# Patient Record
Sex: MALE | Race: BLACK OR AFRICAN AMERICAN | URBAN - METROPOLITAN AREA
[De-identification: names, ages, dates, MRNs, and addresses within clinical notes are randomized per-mention and may not be internally consistent; named-entity substitution may affect disease eponyms.]

---

## 2018-04-17 ENCOUNTER — TRANSFERRED RECORDS (OUTPATIENT)
Dept: HEALTH INFORMATION MANAGEMENT | Facility: CLINIC | Age: 63
End: 2018-04-17

## 2018-06-28 ENCOUNTER — TELEPHONE (OUTPATIENT)
Dept: FAMILY MEDICINE | Facility: CLINIC | Age: 63
End: 2018-06-28
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
## 2021-06-02 ENCOUNTER — RECORDS - HEALTHEAST (OUTPATIENT)
Dept: ADMINISTRATIVE | Facility: CLINIC | Age: 66
End: 2021-06-02

## 2025-06-29 ENCOUNTER — HOSPITAL ENCOUNTER (INPATIENT)
Facility: HOSPITAL | Age: 70
End: 2025-06-29
Attending: EMERGENCY MEDICINE | Admitting: FAMILY MEDICINE
Payer: MEDICARE

## 2025-06-29 DIAGNOSIS — C78.7 METASTATIC COLON CANCER TO LIVER (H): ICD-10-CM

## 2025-06-29 DIAGNOSIS — C18.9 METASTATIC COLON CANCER TO LIVER (H): ICD-10-CM

## 2025-06-29 DIAGNOSIS — K63.89 INTESTINAL MASS: ICD-10-CM

## 2025-06-29 DIAGNOSIS — R11.0 NAUSEA: ICD-10-CM

## 2025-06-29 DIAGNOSIS — K76.9 LIVER LESION: Primary | ICD-10-CM

## 2025-06-29 DIAGNOSIS — R10.11 RUQ ABDOMINAL PAIN: ICD-10-CM

## 2025-06-29 DIAGNOSIS — R10.84 GENERALIZED ABDOMINAL PAIN: ICD-10-CM

## 2025-06-29 DIAGNOSIS — D64.9 ANEMIA, UNSPECIFIED TYPE: ICD-10-CM

## 2025-06-29 LAB — LACTATE SERPL-SCNC: 3.2 MMOL/L (ref 0.7–2)

## 2025-06-29 PROCEDURE — 82728 ASSAY OF FERRITIN: CPT

## 2025-06-29 PROCEDURE — 83605 ASSAY OF LACTIC ACID: CPT | Performed by: EMERGENCY MEDICINE

## 2025-06-29 PROCEDURE — 36415 COLL VENOUS BLD VENIPUNCTURE: CPT | Performed by: EMERGENCY MEDICINE

## 2025-06-29 PROCEDURE — 82310 ASSAY OF CALCIUM: CPT | Performed by: EMERGENCY MEDICINE

## 2025-06-29 PROCEDURE — 96375 TX/PRO/DX INJ NEW DRUG ADDON: CPT

## 2025-06-29 PROCEDURE — 99291 CRITICAL CARE FIRST HOUR: CPT | Mod: 25

## 2025-06-29 PROCEDURE — 82947 ASSAY GLUCOSE BLOOD QUANT: CPT | Performed by: EMERGENCY MEDICINE

## 2025-06-29 PROCEDURE — 250N000011 HC RX IP 250 OP 636: Mod: JZ | Performed by: EMERGENCY MEDICINE

## 2025-06-29 PROCEDURE — 85027 COMPLETE CBC AUTOMATED: CPT | Performed by: EMERGENCY MEDICINE

## 2025-06-29 RX ORDER — ONDANSETRON 2 MG/ML
4 INJECTION INTRAMUSCULAR; INTRAVENOUS ONCE
Status: COMPLETED | OUTPATIENT
Start: 2025-06-30 | End: 2025-06-30

## 2025-06-29 RX ORDER — HYDROMORPHONE HYDROCHLORIDE 1 MG/ML
0.5 INJECTION, SOLUTION INTRAMUSCULAR; INTRAVENOUS; SUBCUTANEOUS ONCE
Refills: 0 | Status: COMPLETED | OUTPATIENT
Start: 2025-06-30 | End: 2025-06-29

## 2025-06-29 RX ADMIN — HYDROMORPHONE HYDROCHLORIDE 0.5 MG: 1 INJECTION, SOLUTION INTRAMUSCULAR; INTRAVENOUS; SUBCUTANEOUS at 23:53

## 2025-06-29 ASSESSMENT — COLUMBIA-SUICIDE SEVERITY RATING SCALE - C-SSRS
1. IN THE PAST MONTH, HAVE YOU WISHED YOU WERE DEAD OR WISHED YOU COULD GO TO SLEEP AND NOT WAKE UP?: NO
2. HAVE YOU ACTUALLY HAD ANY THOUGHTS OF KILLING YOURSELF IN THE PAST MONTH?: NO
6. HAVE YOU EVER DONE ANYTHING, STARTED TO DO ANYTHING, OR PREPARED TO DO ANYTHING TO END YOUR LIFE?: NO

## 2025-06-30 ENCOUNTER — APPOINTMENT (OUTPATIENT)
Dept: ULTRASOUND IMAGING | Facility: HOSPITAL | Age: 70
DRG: 435 | End: 2025-06-30
Attending: STUDENT IN AN ORGANIZED HEALTH CARE EDUCATION/TRAINING PROGRAM
Payer: MEDICARE

## 2025-06-30 ENCOUNTER — APPOINTMENT (OUTPATIENT)
Dept: CT IMAGING | Facility: HOSPITAL | Age: 70
DRG: 435 | End: 2025-06-30
Attending: EMERGENCY MEDICINE
Payer: MEDICARE

## 2025-06-30 PROBLEM — K63.89 INTESTINAL MASS: Status: ACTIVE | Noted: 2025-06-30

## 2025-06-30 PROBLEM — R11.0 NAUSEA: Status: ACTIVE | Noted: 2025-06-30

## 2025-06-30 PROBLEM — K76.9 LIVER LESION: Status: ACTIVE | Noted: 2025-06-30

## 2025-06-30 PROBLEM — D64.9 ANEMIA, UNSPECIFIED TYPE: Status: ACTIVE | Noted: 2025-06-30

## 2025-06-30 PROBLEM — R10.84 GENERALIZED ABDOMINAL PAIN: Status: ACTIVE | Noted: 2025-06-30

## 2025-06-30 LAB
ABO + RH BLD: NORMAL
ALBUMIN SERPL BCG-MCNC: 2.8 G/DL (ref 3.5–5.2)
ALBUMIN UR-MCNC: NEGATIVE MG/DL
ALP SERPL-CCNC: 248 U/L (ref 40–150)
ALT SERPL W P-5'-P-CCNC: 16 U/L (ref 0–70)
ANION GAP SERPL CALCULATED.3IONS-SCNC: 12 MMOL/L (ref 7–15)
ANION GAP SERPL CALCULATED.3IONS-SCNC: 8 MMOL/L (ref 7–15)
APPEARANCE UR: CLEAR
APTT PPP: 34 SECONDS (ref 22–38)
AST SERPL W P-5'-P-CCNC: 98 U/L (ref 0–45)
BILIRUB SERPL-MCNC: 0.6 MG/DL
BILIRUB UR QL STRIP: NEGATIVE
BLD GP AB SCN SERPL QL: NEGATIVE
BLD PROD TYP BPU: NORMAL
BLD PROD TYP BPU: NORMAL
BLOOD COMPONENT TYPE: NORMAL
BLOOD COMPONENT TYPE: NORMAL
BUN SERPL-MCNC: 13.5 MG/DL (ref 8–23)
BUN SERPL-MCNC: 15.3 MG/DL (ref 8–23)
CALCIUM SERPL-MCNC: 8.5 MG/DL (ref 8.8–10.4)
CALCIUM SERPL-MCNC: 8.5 MG/DL (ref 8.8–10.4)
CHLORIDE SERPL-SCNC: 102 MMOL/L (ref 98–107)
CHLORIDE SERPL-SCNC: 99 MMOL/L (ref 98–107)
CODING SYSTEM: NORMAL
CODING SYSTEM: NORMAL
COLOR UR AUTO: NORMAL
CREAT SERPL-MCNC: 0.88 MG/DL (ref 0.67–1.17)
CREAT SERPL-MCNC: 0.94 MG/DL (ref 0.67–1.17)
CROSSMATCH: NORMAL
CROSSMATCH: NORMAL
DACRYOCYTES BLD QL SMEAR: SLIGHT
EGFRCR SERPLBLD CKD-EPI 2021: 88 ML/MIN/1.73M2
EGFRCR SERPLBLD CKD-EPI 2021: >90 ML/MIN/1.73M2
ERYTHROCYTE [DISTWIDTH] IN BLOOD BY AUTOMATED COUNT: 21.2 % (ref 10–15)
ERYTHROCYTE [DISTWIDTH] IN BLOOD BY AUTOMATED COUNT: 21.4 % (ref 10–15)
ERYTHROCYTE [DISTWIDTH] IN BLOOD BY AUTOMATED COUNT: 31.4 % (ref 10–15)
FERRITIN SERPL-MCNC: 100 NG/ML (ref 31–409)
FRAGMENTS BLD QL SMEAR: SLIGHT
GLUCOSE SERPL-MCNC: 174 MG/DL (ref 70–99)
GLUCOSE SERPL-MCNC: 89 MG/DL (ref 70–99)
GLUCOSE UR STRIP-MCNC: NEGATIVE MG/DL
HCO3 SERPL-SCNC: 25 MMOL/L (ref 22–29)
HCO3 SERPL-SCNC: 27 MMOL/L (ref 22–29)
HCT VFR BLD AUTO: 21.2 % (ref 40–53)
HCT VFR BLD AUTO: 23 % (ref 40–53)
HCT VFR BLD AUTO: 29.7 % (ref 40–53)
HGB BLD-MCNC: 10 G/DL (ref 13.3–17.7)
HGB BLD-MCNC: 7 G/DL (ref 13.3–17.7)
HGB BLD-MCNC: 7.6 G/DL (ref 13.3–17.7)
HGB UR QL STRIP: NEGATIVE
INR PPP: 1.08 (ref 0.85–1.15)
ISSUE DATE AND TIME: NORMAL
ISSUE DATE AND TIME: NORMAL
KETONES UR STRIP-MCNC: NEGATIVE MG/DL
LACTATE SERPL-SCNC: 2.4 MMOL/L (ref 0.7–2)
LEUKOCYTE ESTERASE UR QL STRIP: NEGATIVE
MCH RBC QN AUTO: 16.5 PG (ref 26.5–33)
MCH RBC QN AUTO: 16.5 PG (ref 26.5–33)
MCH RBC QN AUTO: 19 PG (ref 26.5–33)
MCHC RBC AUTO-ENTMCNC: 33 G/DL (ref 31.5–36.5)
MCHC RBC AUTO-ENTMCNC: 33 G/DL (ref 31.5–36.5)
MCHC RBC AUTO-ENTMCNC: 33.7 G/DL (ref 31.5–36.5)
MCV RBC AUTO: 50 FL (ref 78–100)
MCV RBC AUTO: 50 FL (ref 78–100)
MCV RBC AUTO: 57 FL (ref 78–100)
NITRATE UR QL: NEGATIVE
PATH REV: ABNORMAL
PH UR STRIP: 5.5 [PH] (ref 5–7)
PLAT MORPH BLD: ABNORMAL
PLATELET # BLD AUTO: 410 10E3/UL (ref 150–450)
PLATELET # BLD AUTO: 424 10E3/UL (ref 150–450)
PLATELET # BLD AUTO: 449 10E3/UL (ref 150–450)
POTASSIUM SERPL-SCNC: 3.8 MMOL/L (ref 3.4–5.3)
POTASSIUM SERPL-SCNC: 4.2 MMOL/L (ref 3.4–5.3)
PROT SERPL-MCNC: 6.3 G/DL (ref 6.4–8.3)
PROTHROMBIN TIME: 14.2 SECONDS (ref 11.8–14.8)
RBC # BLD AUTO: 4.24 10E6/UL (ref 4.4–5.9)
RBC # BLD AUTO: 4.6 10E6/UL (ref 4.4–5.9)
RBC # BLD AUTO: 5.26 10E6/UL (ref 4.4–5.9)
RBC MORPH BLD: ABNORMAL
RBC URINE: <1 /HPF
SICKLE CELLS BLD QL SMEAR: SLIGHT
SODIUM SERPL-SCNC: 136 MMOL/L (ref 135–145)
SODIUM SERPL-SCNC: 137 MMOL/L (ref 135–145)
SP GR UR STRIP: 1.02 (ref 1–1.03)
SPECIMEN EXP DATE BLD: NORMAL
SQUAMOUS EPITHELIAL: <1 /HPF
TARGETS BLD QL SMEAR: ABNORMAL
TRANSITIONAL EPI: <1 /HPF
UNIT ABO/RH: NORMAL
UNIT ABO/RH: NORMAL
UNIT NUMBER: NORMAL
UNIT NUMBER: NORMAL
UNIT STATUS: NORMAL
UNIT STATUS: NORMAL
UNIT TYPE ISBT: 7300
UNIT TYPE ISBT: 7300
UROBILINOGEN UR STRIP-MCNC: NORMAL MG/DL
WBC # BLD AUTO: 7.2 10E3/UL (ref 4–11)
WBC # BLD AUTO: 7.7 10E3/UL (ref 4–11)
WBC # BLD AUTO: 8.8 10E3/UL (ref 4–11)
WBC URINE: 1 /HPF

## 2025-06-30 PROCEDURE — P9016 RBC LEUKOCYTES REDUCED: HCPCS | Performed by: EMERGENCY MEDICINE

## 2025-06-30 PROCEDURE — 74177 CT ABD & PELVIS W/CONTRAST: CPT

## 2025-06-30 PROCEDURE — 96376 TX/PRO/DX INJ SAME DRUG ADON: CPT

## 2025-06-30 PROCEDURE — 250N000011 HC RX IP 250 OP 636: Performed by: EMERGENCY MEDICINE

## 2025-06-30 PROCEDURE — 88377 M/PHMTRC ALYS ISHQUANT/SEMIQ: CPT | Performed by: STUDENT IN AN ORGANIZED HEALTH CARE EDUCATION/TRAINING PROGRAM

## 2025-06-30 PROCEDURE — 36415 COLL VENOUS BLD VENIPUNCTURE: CPT | Performed by: EMERGENCY MEDICINE

## 2025-06-30 PROCEDURE — 88377 M/PHMTRC ALYS ISHQUANT/SEMIQ: CPT | Mod: 26 | Performed by: MEDICAL GENETICS

## 2025-06-30 PROCEDURE — 86901 BLOOD TYPING SEROLOGIC RH(D): CPT | Performed by: EMERGENCY MEDICINE

## 2025-06-30 PROCEDURE — 88341 IMHCHEM/IMCYTCHM EA ADD ANTB: CPT | Mod: TC | Performed by: STUDENT IN AN ORGANIZED HEALTH CARE EDUCATION/TRAINING PROGRAM

## 2025-06-30 PROCEDURE — 81001 URINALYSIS AUTO W/SCOPE: CPT

## 2025-06-30 PROCEDURE — 36415 COLL VENOUS BLD VENIPUNCTURE: CPT

## 2025-06-30 PROCEDURE — 250N000013 HC RX MED GY IP 250 OP 250 PS 637

## 2025-06-30 PROCEDURE — 83605 ASSAY OF LACTIC ACID: CPT | Performed by: EMERGENCY MEDICINE

## 2025-06-30 PROCEDURE — 85027 COMPLETE CBC AUTOMATED: CPT | Performed by: EMERGENCY MEDICINE

## 2025-06-30 PROCEDURE — 258N000003 HC RX IP 258 OP 636: Performed by: EMERGENCY MEDICINE

## 2025-06-30 PROCEDURE — 99222 1ST HOSP IP/OBS MODERATE 55: CPT | Mod: AI

## 2025-06-30 PROCEDURE — 80048 BASIC METABOLIC PNL TOTAL CA: CPT

## 2025-06-30 PROCEDURE — 120N000001 HC R&B MED SURG/OB

## 2025-06-30 PROCEDURE — 85018 HEMOGLOBIN: CPT

## 2025-06-30 PROCEDURE — 250N000011 HC RX IP 250 OP 636: Mod: JZ | Performed by: EMERGENCY MEDICINE

## 2025-06-30 PROCEDURE — 250N000011 HC RX IP 250 OP 636: Performed by: STUDENT IN AN ORGANIZED HEALTH CARE EDUCATION/TRAINING PROGRAM

## 2025-06-30 PROCEDURE — 272N000710 US BIOPSY LIVER

## 2025-06-30 PROCEDURE — 96374 THER/PROPH/DIAG INJ IV PUSH: CPT | Mod: 59

## 2025-06-30 PROCEDURE — 86923 COMPATIBILITY TEST ELECTRIC: CPT | Performed by: EMERGENCY MEDICINE

## 2025-06-30 PROCEDURE — 96361 HYDRATE IV INFUSION ADD-ON: CPT

## 2025-06-30 PROCEDURE — 0FB03ZX EXCISION OF LIVER, PERCUTANEOUS APPROACH, DIAGNOSTIC: ICD-10-PCS | Performed by: STUDENT IN AN ORGANIZED HEALTH CARE EDUCATION/TRAINING PROGRAM

## 2025-06-30 PROCEDURE — 85730 THROMBOPLASTIN TIME PARTIAL: CPT

## 2025-06-30 PROCEDURE — 250N000011 HC RX IP 250 OP 636

## 2025-06-30 PROCEDURE — 85610 PROTHROMBIN TIME: CPT

## 2025-06-30 RX ORDER — FLUMAZENIL 0.1 MG/ML
0.2 INJECTION, SOLUTION INTRAVENOUS
Status: DISCONTINUED | OUTPATIENT
Start: 2025-06-30 | End: 2025-06-30

## 2025-06-30 RX ORDER — IOPAMIDOL 755 MG/ML
90 INJECTION, SOLUTION INTRAVASCULAR ONCE
Status: COMPLETED | OUTPATIENT
Start: 2025-06-30 | End: 2025-06-30

## 2025-06-30 RX ORDER — NALOXONE HYDROCHLORIDE 0.4 MG/ML
0.2 INJECTION, SOLUTION INTRAMUSCULAR; INTRAVENOUS; SUBCUTANEOUS
Status: ACTIVE | OUTPATIENT
Start: 2025-06-30

## 2025-06-30 RX ORDER — AMOXICILLIN 250 MG
1 CAPSULE ORAL 2 TIMES DAILY PRN
Status: ACTIVE | OUTPATIENT
Start: 2025-06-30

## 2025-06-30 RX ORDER — NALOXONE HYDROCHLORIDE 0.4 MG/ML
0.2 INJECTION, SOLUTION INTRAMUSCULAR; INTRAVENOUS; SUBCUTANEOUS
Status: DISCONTINUED | OUTPATIENT
Start: 2025-06-30 | End: 2025-06-30

## 2025-06-30 RX ORDER — HYDROMORPHONE HYDROCHLORIDE 1 MG/ML
0.5 INJECTION, SOLUTION INTRAMUSCULAR; INTRAVENOUS; SUBCUTANEOUS ONCE
Refills: 0 | Status: COMPLETED | OUTPATIENT
Start: 2025-06-30 | End: 2025-06-30

## 2025-06-30 RX ORDER — ONDANSETRON 2 MG/ML
4 INJECTION INTRAMUSCULAR; INTRAVENOUS EVERY 6 HOURS PRN
Status: ACTIVE | OUTPATIENT
Start: 2025-06-30

## 2025-06-30 RX ORDER — HYDROMORPHONE HYDROCHLORIDE 1 MG/ML
0.3 INJECTION, SOLUTION INTRAMUSCULAR; INTRAVENOUS; SUBCUTANEOUS
Status: DISCONTINUED | OUTPATIENT
Start: 2025-06-30 | End: 2025-07-03

## 2025-06-30 RX ORDER — OXYCODONE HYDROCHLORIDE 5 MG/1
5 TABLET ORAL EVERY 4 HOURS PRN
Status: DISCONTINUED | OUTPATIENT
Start: 2025-06-30 | End: 2025-07-01

## 2025-06-30 RX ORDER — AMLODIPINE BESYLATE 5 MG/1
5 TABLET ORAL DAILY
Status: ON HOLD | COMMUNITY

## 2025-06-30 RX ORDER — LIDOCAINE 40 MG/G
CREAM TOPICAL
Status: ACTIVE | OUTPATIENT
Start: 2025-06-30

## 2025-06-30 RX ORDER — AMOXICILLIN 250 MG
2 CAPSULE ORAL 2 TIMES DAILY PRN
Status: ACTIVE | OUTPATIENT
Start: 2025-06-30

## 2025-06-30 RX ORDER — ACETAMINOPHEN 500 MG
500 TABLET ORAL EVERY 6 HOURS
Status: DISPENSED | OUTPATIENT
Start: 2025-06-30

## 2025-06-30 RX ORDER — ACETAMINOPHEN 500 MG
500 TABLET ORAL EVERY 6 HOURS PRN
Status: DISCONTINUED | OUTPATIENT
Start: 2025-06-30 | End: 2025-06-30

## 2025-06-30 RX ORDER — NALOXONE HYDROCHLORIDE 0.4 MG/ML
0.4 INJECTION, SOLUTION INTRAMUSCULAR; INTRAVENOUS; SUBCUTANEOUS
Status: DISCONTINUED | OUTPATIENT
Start: 2025-06-30 | End: 2025-06-30

## 2025-06-30 RX ORDER — ONDANSETRON 4 MG/1
4 TABLET, ORALLY DISINTEGRATING ORAL EVERY 6 HOURS PRN
Status: DISPENSED | OUTPATIENT
Start: 2025-06-30

## 2025-06-30 RX ORDER — FENTANYL CITRATE 50 UG/ML
25-50 INJECTION, SOLUTION INTRAMUSCULAR; INTRAVENOUS EVERY 5 MIN PRN
Refills: 0 | Status: DISCONTINUED | OUTPATIENT
Start: 2025-06-30 | End: 2025-06-30

## 2025-06-30 RX ORDER — DIPHENHYDRAMINE HYDROCHLORIDE 50 MG/ML
25 INJECTION, SOLUTION INTRAMUSCULAR; INTRAVENOUS ONCE
Status: COMPLETED | OUTPATIENT
Start: 2025-06-30 | End: 2025-06-30

## 2025-06-30 RX ORDER — ENOXAPARIN SODIUM 100 MG/ML
40 INJECTION SUBCUTANEOUS EVERY 24 HOURS
Status: DISPENSED | OUTPATIENT
Start: 2025-06-30

## 2025-06-30 RX ORDER — HYDROXYZINE HYDROCHLORIDE 10 MG/1
10 TABLET, FILM COATED ORAL 3 TIMES DAILY PRN
Status: DISPENSED | OUTPATIENT
Start: 2025-06-30

## 2025-06-30 RX ORDER — NALOXONE HYDROCHLORIDE 0.4 MG/ML
0.4 INJECTION, SOLUTION INTRAMUSCULAR; INTRAVENOUS; SUBCUTANEOUS
Status: ACTIVE | OUTPATIENT
Start: 2025-06-30

## 2025-06-30 RX ORDER — CALCIUM CARBONATE 500 MG/1
1000 TABLET, CHEWABLE ORAL 4 TIMES DAILY PRN
Status: ACTIVE | OUTPATIENT
Start: 2025-06-30

## 2025-06-30 RX ORDER — ACETAMINOPHEN 500 MG
500-1000 TABLET ORAL EVERY 6 HOURS PRN
Status: ON HOLD | COMMUNITY

## 2025-06-30 RX ORDER — POLYETHYLENE GLYCOL 3350 17 G/17G
17 POWDER, FOR SOLUTION ORAL DAILY
Status: DISPENSED | OUTPATIENT
Start: 2025-06-30

## 2025-06-30 RX ORDER — PROCHLORPERAZINE MALEATE 5 MG/1
5 TABLET ORAL EVERY 6 HOURS PRN
Status: ACTIVE | OUTPATIENT
Start: 2025-06-30

## 2025-06-30 RX ORDER — AMLODIPINE BESYLATE 5 MG/1
5 TABLET ORAL 2 TIMES DAILY
Status: DISPENSED | OUTPATIENT
Start: 2025-06-30

## 2025-06-30 RX ADMIN — ACETAMINOPHEN 500 MG: 500 TABLET ORAL at 18:42

## 2025-06-30 RX ADMIN — FENTANYL CITRATE 50 MCG: 50 INJECTION, SOLUTION INTRAMUSCULAR; INTRAVENOUS at 12:58

## 2025-06-30 RX ADMIN — OXYCODONE HYDROCHLORIDE 5 MG: 5 TABLET ORAL at 08:34

## 2025-06-30 RX ADMIN — HYDROMORPHONE HYDROCHLORIDE 0.5 MG: 1 INJECTION, SOLUTION INTRAMUSCULAR; INTRAVENOUS; SUBCUTANEOUS at 01:16

## 2025-06-30 RX ADMIN — IOPAMIDOL 90 ML: 755 INJECTION, SOLUTION INTRAVENOUS at 02:06

## 2025-06-30 RX ADMIN — HYDROXYZINE HYDROCHLORIDE 10 MG: 10 TABLET, FILM COATED ORAL at 19:50

## 2025-06-30 RX ADMIN — HYDROMORPHONE HYDROCHLORIDE 0.3 MG: 1 INJECTION, SOLUTION INTRAMUSCULAR; INTRAVENOUS; SUBCUTANEOUS at 23:30

## 2025-06-30 RX ADMIN — HYDROMORPHONE HYDROCHLORIDE 0.3 MG: 1 INJECTION, SOLUTION INTRAMUSCULAR; INTRAVENOUS; SUBCUTANEOUS at 16:40

## 2025-06-30 RX ADMIN — OXYCODONE HYDROCHLORIDE 5 MG: 5 TABLET ORAL at 15:09

## 2025-06-30 RX ADMIN — MIDAZOLAM HYDROCHLORIDE 0.5 MG: 1 INJECTION, SOLUTION INTRAMUSCULAR; INTRAVENOUS at 13:01

## 2025-06-30 RX ADMIN — HYDROMORPHONE HYDROCHLORIDE 0.3 MG: 1 INJECTION, SOLUTION INTRAMUSCULAR; INTRAVENOUS; SUBCUTANEOUS at 19:50

## 2025-06-30 RX ADMIN — FENTANYL CITRATE 50 MCG: 50 INJECTION, SOLUTION INTRAMUSCULAR; INTRAVENOUS at 13:03

## 2025-06-30 RX ADMIN — AMLODIPINE BESYLATE 5 MG: 5 TABLET ORAL at 18:42

## 2025-06-30 RX ADMIN — ONDANSETRON 4 MG: 2 INJECTION INTRAMUSCULAR; INTRAVENOUS at 00:00

## 2025-06-30 RX ADMIN — DIPHENHYDRAMINE HYDROCHLORIDE 25 MG: 50 INJECTION, SOLUTION INTRAMUSCULAR; INTRAVENOUS at 04:25

## 2025-06-30 RX ADMIN — POLYETHYLENE GLYCOL 3350 17 G: 17 POWDER, FOR SOLUTION ORAL at 15:10

## 2025-06-30 RX ADMIN — AMLODIPINE BESYLATE 5 MG: 5 TABLET ORAL at 12:16

## 2025-06-30 RX ADMIN — HYDROMORPHONE HYDROCHLORIDE 1 MG: 1 INJECTION, SOLUTION INTRAMUSCULAR; INTRAVENOUS; SUBCUTANEOUS at 02:46

## 2025-06-30 RX ADMIN — OXYCODONE HYDROCHLORIDE 5 MG: 5 TABLET ORAL at 21:54

## 2025-06-30 RX ADMIN — SODIUM CHLORIDE 1000 ML: 0.9 INJECTION, SOLUTION INTRAVENOUS at 00:22

## 2025-06-30 RX ADMIN — HYDROMORPHONE HYDROCHLORIDE 0.3 MG: 1 INJECTION, SOLUTION INTRAMUSCULAR; INTRAVENOUS; SUBCUTANEOUS at 10:14

## 2025-06-30 RX ADMIN — MIDAZOLAM HYDROCHLORIDE 1 MG: 1 INJECTION, SOLUTION INTRAMUSCULAR; INTRAVENOUS at 12:56

## 2025-06-30 ASSESSMENT — ACTIVITIES OF DAILY LIVING (ADL)
ADLS_ACUITY_SCORE: 41
ADLS_ACUITY_SCORE: 51
ADLS_ACUITY_SCORE: 41
ADLS_ACUITY_SCORE: 50
ADLS_ACUITY_SCORE: 50
ADLS_ACUITY_SCORE: 41
ADLS_ACUITY_SCORE: 43
ADLS_ACUITY_SCORE: 50
ADLS_ACUITY_SCORE: 50
ADLS_ACUITY_SCORE: 41
ADLS_ACUITY_SCORE: 43
ADLS_ACUITY_SCORE: 50
ADLS_ACUITY_SCORE: 43
ADLS_ACUITY_SCORE: 50
ADLS_ACUITY_SCORE: 43

## 2025-06-30 NOTE — PROGRESS NOTES
"Paged by nursing staff to come evaluate the patient for pain at his biopsy site as well as concern for some swelling per family. The patient reports that he has been feeling some pain along his right abdomen near the biopsy site and is tender to palpation all across the right side of his abdomen. He feels mildly nauseated but has not had any vomiting. He has had a bowel movement today and has an appetite so he will plan to eat dinner soon.     Vital signs:  Temp: 98.1  F (36.7  C) Temp src: Oral BP: (!) 170/92 Pulse: 85   Resp: 18 SpO2: 96 % O2 Device: None (Room air)   Height: 177.8 cm (5' 10\") Weight: 61.2 kg (135 lb)  Estimated body mass index is 19.37 kg/m  as calculated from the following:    Height as of this encounter: 1.778 m (5' 10\").    Weight as of this encounter: 61.2 kg (135 lb).    GEN: Pleasant male, in no acute distress.  HEENT: Normocephalic, atraumatic. Extraoccular eye movements intact. Mildly icteric sclera.   PULM: Non-labored breathing. Speaking in full sentences.   CV: Regular rate and rhythm.  ABDOMEN: TTP along the entire right side of the abdomen. No tenderness along the left side of the abdomen. Non-distended.  Chlorhexidine staining along the right abdomen. Massive hepatomegaly present.   NEURO:  Awake. Oriented to person, place, time and situation. Cranial nerves 2-12 grossly intact. Moving all extremities.    PSYCH: Calm. Appropriate affect, insight, judgment.     Plan:   - Apply ice pack to the right abdomen   - Schedule Tylenol q6h   - Continue prn Dilaudid, and oxycodone   - Monitor closely, if pain persists would recommend re-imaging the area to evaluate for post-operative bleeding/complications    Angelica Mckinney, DO      "

## 2025-06-30 NOTE — H&P
[facility]    History and Physical - Hospitalist Service    Date of Admission:  6/29/2025    Assessment & Plan      Isai Leong is a 69 year old male admitted on 6/29/2025. He has PMH of depression, hypertension, headaches and is admitted for chronic right sided abdominal pain found to have a large mass in his colon with likely metastatic liver disease.    Lobulated Mass of Ascending Colon, with Ileum and Lymph Node Involvement  Metastatic Liver Lesions  Chronic right abdominal pain with masses as above, CT imaging with very large liver and multiple lesions along with the ascending colon mass. Physical exam consistent with large nodular liver, likely primary cancer of the colon with metastasis to the liver. Weight loss, poor appetite, also suggestive of underlying cancer. [facility] need [facility] consultation and biopsy, but [facility].  - Inquire about when/where he would like consults/biopsy studies   - Anticipate IR consult for liver biopsy if done at [facility]   - Anticipate oncology consult pending results if cancerous  - PT/INR (holding DVT prophylaxis until this results)  - Oxycodone q4h PRN for pain control  - Avoid tylenol given liver disease  - NPO in case of possible procedure    Microcytic anemia  MCV 50, Hg 7, no signs/symptoms of bleeding. Hemodynamically stable. Mass in abdomen, may be primary colon cancer. Would be a candidate for colonoscopy.   - Ferritin  - Peripheral smear in process  - RBC transfusion x2 units  - Recheck Hg after transfusion complete, q6h after that    Lactic Acidosis  Likely Type A lactic acidosis, improved with fluid resuscitation and will be getting RBC transfusions.   - No further laboratory testing required    Itching   - Hydroxyzine PRN            Diet: NPO for Procedure/Surgery per Anesthesia Guidelines Except for: Meds; Clear liquids before procedure/surgery: ADULT (Age GREATER than or  Equal to 18 years) - Clear liquids 2 hours before procedure/surgery  DVT Prophylaxis: Enoxaparin (Lovenox) subcutaneous - HELD until INR/PT return  Her Catheter: Not present  Lines: None     Cardiac Monitoring: None  Code Status: Full Code    Clinically Significant Risk Factors Present on Admission               # Hypoalbuminemia: Lowest albumin = 2.8 g/dL at 6/29/2025 11:50 PM, will monitor as appropriate     # Hypertension: Noted on problem list        # Anemia: based on hgb <11                  Disposition Plan     Medically Ready for Discharge: Anticipated in 2-4 Days       The patient's care was discussed with the Attending Physician, Dr. Hoffman.    Isauro Apple MD  Hospitalist Service  [facility]  Securely message with [employer] (more info)  Text page via [employer] Paging/Directory   ____________________________________________________________      Chief Complaint   Right sided abdominal pain    History is obtained from the patient      History of Present Illness   Isai Leong is a 69 year old male who has PMH of depression, hypertension, headaches and came to the ED for concern of right sided abdominal pain.    Pain has been present for 3 years, slowly getting worse and has noticed a mass there. He did not seek out prior care because he did not want people to know he was in pain. Nausea, poor appetite, has lost 35 pounds in the last 3 months. No bloody stool, melena, diarrhea, constipation. No night sweats. Wants to know what is going on with his health at this point. Understands he may have cancer.       Past Medical History    Past Medical History:   Diagnosis Date    Chronic neck pain     Depression     Hypertension        Past Surgical History   Past Surgical History:   Procedure Laterality Date    NECK SURGERY         Prior to Admission Medications   Prior to Admission Medications   Prescriptions Last Dose Informant Patient Reported? Taking?   acetaminophen (TYLENOL) 500  MG tablet Past Week Morning  Yes Yes   Sig: Take 500-1,000 mg by mouth every 6 hours as needed for mild pain.   amLODIPine (NORVASC) 5 MG tablet 6/30/2025 Morning  Yes Yes   Sig: Take 5 mg by mouth 3 times daily.      Facility-Administered Medications: None           Physical Exam   Vital Signs: Temp: 98.4  F (36.9  C) Temp src: Oral BP: (!) 155/88 Pulse: 71   Resp: 20 SpO2: 100 % O2 Device: None (Room air)    Weight: 135 lbs 0 oz    General: No acute distress.  Skin: Warm, dry, intact no rashes or lesions.  HEENT: Normocephalic, atraumatic. Sclera non-icteric. EOMI.  Cardiac: RRR without murmurs, gallops. Normal S1 and S2.  Respiratory: Normal work of breathing. Clear to auscultation bilaterally.  Abdominal: Large nodular mass present throughout right abdomen ranging across the epigastrium, soft non-tender left abdomen.   Extremities: No edema. Atraumatic.  Neurological: Awake, alert with normal speech. Cranial nerves are intact. Moves all extremities, no gross strength deficits.  Psychiatric: Appropriate mood and affect. Good judgement and insight.        Medical Decision Making             Data

## 2025-06-30 NOTE — MEDICATION SCRIBE - ADMISSION MEDICATION HISTORY
Medication Scribe Admission Medication History    Admission medication history is complete. The information provided in this note is only as accurate as the sources available at the time of the update.    Information Source(s): Patient, Family member, and CareEverywhere/SureScripts via in-person    Pertinent Information: The patient reports self-managing medications and states that the only medications currently being taken are acetaminophen and amlodipine; however, there is no documented fill history to support this.    Changes made to PTA medication list:  Added:   acetaminophen (TYLENOL) 500 MG (per patient)  amLODIPine (NORVASC) 5 MG (per patient)    Deleted: per patient   chlorthalidone (HYGROTON) 25 MG   GABAPENTIN PO  HYDROcodone-acetaminophen (NORCO) 7.5-325 MG per   ibuprofen (ADVIL,MOTRIN) 400 MG   lisinopril (PRINIVIL,ZESTRIL) 10 MG   metoprolol (TOPROL-XL) 50 MG 24 hr   naproxen (NAPROSYN) 500 MG   orphenadrine (NORFLEX) 100 MG   oxyCODONE-acetaminophen (PERCOCET) 5-325 MG per   sertraline (ZOLOFT) 50 MG   Changed: None    Allergies reviewed with patient and updates made in EHR: yes    Medication History Completed By: Aklilu Gebreyesus 6/30/2025 3:45 AM    PTA Med List   Medication Sig Last Dose/Taking    acetaminophen (TYLENOL) 500 MG tablet Take 500-1,000 mg by mouth every 6 hours as needed for mild pain. Past Week Morning    amLODIPine (NORVASC) 5 MG tablet Take 5 mg by mouth 3 times daily. 6/30/2025 Morning

## 2025-06-30 NOTE — PROGRESS NOTES
Writer took over this pt at 1515. Pt alert and oriented times 4. BP was elevated to 195/92 when writer took over this pt. Recheck BP and was still high at 197/93. Per report, pt had recently received oxycodone for abdomen pain. Writer paged MD and notified about increased BP. MD aware and wanted writer to check later once pain medication starts working.  Recheck BP later was better. Last BP was 159/72 with HR of 94. Pt said oxycodone was not helpful for pain and rated pain 10/10. Gave prn IV dilaudid with little relief. Pt still needs urine sample. He had voided right before shift change per pt. Pt on K protocol. Recheck lab in am per protocol. Biopsy site dry and intact with band aid. Report given to p2 nurse at [phone].

## 2025-06-30 NOTE — PROGRESS NOTES
[facility]    Progress Note - Hospitalist Service       Date of Admission:  6/29/2025    Assessment & Plan   Isai Leong is a 69 year old male admitted on 6/29/2025. He has a history of depression, hypertension and is admitted for abdominal pain found to have a large mass in his ascending colon concerning for malignancy with likely metastases to the liver.     Lobulated Mass of Ascending Colon, with Ileum and Lymph Node Involvement  Likely metastatic liver lesions  Three years of right-sided abdominal pain with CT showing ascending colon mass and multiple liver masses concerning for metastases. Physical exam consistent with large nodular liver, likely primary cancer of the colon with metastasis to the liver. Weight loss, poor appetite, also suggestive of underlying cancer. [facility] at [facility]. Discussed first step would be obtaining a biopsy with likely plan for oncology involvement in the outpatient setting if this is confirmed to be malignancy. Will continue to titrate pain medications.  - IR consult for biopsy   - Holding DVT ppx prior to procedure  - Oxycodone and dilaudid PRN   - NPO in case of possible procedure  - Will schedule miralax with increased opioids  - Diet after biopsy     Microcytic anemia  Likely blood loss anemia  MCV 50, Hg 7, no signs/symptoms of bleeding. Hemodynamically stable. Mass in abdomen, may be primary colon cancer. Concern for likely blood loss from GI issues, as above, but patient without any obvious bloody or melenotic stools. Transfused with two units with hemoglobin up to 10 upon recheck.   - Peripheral smear in process  - S/p pRBC transfusion x2 units  - daily CBC    Hypertension  Appeared to be taking amlodipine 5 mg TID. Unclear reason why.   - amlodipine 5 mg BID     Itching   - Hydroxyzine PRN          Diet: NPO for Procedure/Surgery per Anesthesia Guidelines Except for: Meds; Clear  liquids before procedure/surgery: ADULT (Age GREATER than or Equal to 18 years) - Clear liquids 2 hours before procedure/surgery    DVT Prophylaxis: Pneumatic Compression Devices  Her Catheter: Not present  Fluids: PO  Lines: None     Cardiac Monitoring: None  Code Status: Full Code      Clinically Significant Risk Factors Present on Admission               # Hypoalbuminemia: Lowest albumin = 2.8 g/dL at 6/29/2025 11:50 PM, will monitor as appropriate     # Hypertension: Noted on problem list        # Anemia: based on hgb <11                  Social Drivers of Health   Tobacco Use: High Risk (5/11/2021)    Received from [employer]    Patient History     Smoking Tobacco Use: Every Day     Smokeless Tobacco Use: Unknown         Disposition Plan     Medically Ready for Discharge: Anticipated Tomorrow         The patient's care was discussed with the Attending Physician, Dr. Hoffman.    Brent Lopez MD  Hospitalist Service  [facility]  Securely message with [employer] (more info)  Text page via [employer] Paging/Directory   ______________________________________________________________________    Interval History   Patient continuing to have fairly significant abdominal pain. Feeling better with IV dilaudid. Denies any black or bloody stools. Still having bowel movements and passing flatus. Interested in pursuing initial workup here in the [facility]. Wants to go forward with biopsy.     Physical Exam   Vital Signs: Temp: 98.9  F (37.2  C) Temp src: Oral BP: (!) 145/80 Pulse: 62   Resp: 20 SpO2: 98 % O2 Device: Nasal cannula    Weight: 135 lbs 0 oz    GEN: Pleasant male. Appears somewhat uncomfortable with changes in position.   HEENT: Normocephalic, atraumatic. Extraoccular eye movements intact. Mild scleral icterus  PULM: Non-labored breathing. No use of accessory muscles. Clear to ausculation bilaterally. No wheezes or crackles.   CV: Regular rate and rhythm. Normal  S1, S2. No rubs, murmurs, or gallops.    ABDOMEN: Somewhat distended with palpable mass and firmness primarily in the RUQ. Significant tenderness to palpation.   EXTREMITES:  No clubbing, cyanosis, or edema.    SKIN: No rash on limited skin exam  NEURO:  Awake. Oriented to person, place, time and situation. Cranial nerves 2-12 grossly intact. Moving all extremities.    PSYCH: Calm. Appropriate affect, insight, judgment.       Medical Decision Making       Please see A&P for additional details of medical decision making.      Data   ------------------------- PAST 24 HR DATA REVIEWED -----------------------------------------------

## 2025-06-30 NOTE — ED TRIAGE NOTES
Right sided abdominal pain states worse with palpation. Patient reports that he has had the pain for over a year but it is much worse. Patient states he is scheduled to have an ultrasound on 7/1 but pain has increased tonight.

## 2025-06-30 NOTE — PROCEDURES
[facility]    Procedure: Ultrasound guided liver biopsy with moderate sedation    Date/Time: 6/30/2025 1:23 PM    Performed by: Abhi Orr MD  Authorized by: Abhi Orr MD  IR Fellow Physician:    Pre Procedure Diagnosis: Colon mass with suspected liver metastasis  Post Procedure Diagnosis: Same    UNIVERSAL PROTOCOL   Site Marked: Yes  Prior Images Obtained and Reviewed:  Yes  Required items: Required blood products, implants, devices and special equipment available    Patient identity confirmed:  Verbally with patient, arm band and hospital-assigned identification number  Patient was reevaluated immediately before administering moderate or deep sedation or anesthesia  Confirmation Checklist:  Patient's identity using two indicators, relevant allergies, procedure was appropriate and matched the consent or emergent situation and correct equipment/implants were available  Time out: Immediately prior to the procedure a time out was called    Universal Protocol: the Joint Commission Universal Protocol was followed    Preparation: Patient was prepped and draped in usual sterile fashion       ANESTHESIA    Anesthesia:  Local infiltration  Local Anesthetic:  Lidocaine 1% without epinephrine      SEDATION  Patient Sedated: Yes    Sedation Type:  Moderate (conscious) sedation  Sedation:  Fentanyl, midazolam and see MAR for details  Vital signs: Vital signs monitored during sedation    Findings: Successful biopsy of liver lesion, 5 cores obtained and adequate by pathology preliminary.     Specimens: core needle biopsy specimens sent for pathological analysis    Procedural Complications: None    Condition: Stable    Plan: Bedrest for 3 hours, can eat/drink after first hour.       PROCEDURE    Length of time physician/provider present for 1:1 monitoring during sedation:  0-22 min

## 2025-06-30 NOTE — IR NOTE
Patient Name: Isai Leong  Medical Record Number: [id number]  Today's Date: 6/30/2025    Procedure: liver bx  Proceduralist: DR Whitney    Procedure Start: 1305  Procedure end: 1320  Sedation medications administered: 1.5 mg midazolam and 100 mcg fentanyl   Sedation time: 15 minutes    Report given to: Chelle BUNCH. Site dry and intact

## 2025-06-30 NOTE — ED PROVIDER NOTES
EMERGENCY DEPARTMENT ENCOUNTER      NAME: Isai Leong  AGE: 69 year old male  YOB: 1955  EVALUATION DATE & TIME: 6/29/2025 11:28 PM    ED PROVIDER: Genevieve Scherer MD    Chief Complaint   Patient presents with    Abdominal Pain       FINAL IMPRESSION  1. Generalized abdominal pain    2. Anemia, unspecified type    3. Intestinal mass    4. Nausea        MEDICAL DECISION MAKING   Isai Leong is a 69 year old male who presents with with family for evaluation of sbdominal pain and nausea.  Patient reports that this has been ongoing for months but over the last couple of days, has become intolerable.  He has not had any associated fevers, emesis, diarrhea, constipation, urinary symptoms.  He does note that he has not had much of an appetite for quite some time.    Records reviewed.  Patient seen at urgent care on 5/11/2021 with complaints of right-sided abdominal pain bloating.  An x-ray suggested bowel obstruction and he was advised to present to the ED for further evaluation.  I do not see that he was ever actually evaluated for this.  Patient does note that [facility]    I considered a broad differential including but not limited to GERD, PUD, gastritis, pancreatitis, hepatobiliary disease, diverticulitis, ureterolithiasis, pyelonephritis, cystitis, hernia, small bowel obstruction/ileus, mass, perforation, AAA, mesenteric ischemia. I have lower suspicion for symptoms secondary to cardiopulmonary or vascular process given history and exam.  Discussed options for workup with the patient. We agreed on plan for labs, CT, and management of symptoms with IV fluids, analgesic/antiemetic.      ED Course as of 06/30/25 0451   Mon Jun 30, 2025   0016 Lactic Acid(!): 3.2  Lactate elevated, suspect related to demand rather than sepsis/bacteremia.  For now, we will continue IV fluids.   0016 CBC (+ platelets, no diff)(!)  CBC with fairly significant anemia and hemoglobin of  7.6.  No recent for comparison.  No leukocytosis.   0016 Comprehensive metabolic panel(!)  CMP notable for elevated alk phos at 248 and AST at 98.  Normal ALT.  Glucose 174 but without acidosis or anion gap to suggest DKA.  No acute kidney injury.  No other electrolyte derangement.   0310 CBC (+ platelets, no diff)(!)  Repeat CBC confirmed hemoglobin to be low.  Patient not aware of any history of anemia and denies bloody or dark stools.  Will plan to consent for blood and transfuse.   0310 Lactic Acid(!): 2.4  Lactate slightly improved after fluids.   0351 CT Abdomen Pelvis w Contrast  CT with lobulated circumferential mass of the ascending colon with possible involvement of terminal ileum and with adjacent mesenteric lymphadenopathy as well as multiple metastatic lesions throughout the liver.     Workup was notable for the above. I rechecked the patient multiple times and reviewed results  With he and his family members.  We discussed potential etiology of his symptoms as well as options for further workup and management.  Given significant anemia and CT findings, I did recommend admission and they would feel more comfortable with this plan going home and trying to arrange outpatient follow-up this very reasonable.  I did obtain written verbal consent for transfusion and ordered PRBCs.  Patient was given multiple doses of IV analgesic to control his pain but on final reevaluation, was much more comfortable.  I discussed the case with hospitalist who agreed to facilitate admission.    Critical care: 60 minutes excluding separately billable procedures.  Includes bedside management, time reviewing test results, review of records, discussing the case with staff, documenting the medical record and time spent with family members (or surrogate decision makers) discussing specific treatment issues.       Additional Considerations in MDM  History:  Supplemental history from: patient's family   External Record(s) reviewed:  Urgent care 5/11/2021    Work Up:  Chart documentation includes differential diagnoses considered and any EKGs or imaging independently interpreted as specified above.   In additional to work up documented, I considered additional advanced imaging and laboratory workup but deferred after shared decision making conversation with patient/family    External Consultation(s):  Discussion of management with another provider as documented above and in ED course     Chronic Illness(es):  Care impacted by chronic illness(es): Hypertension    Disposition considerations: Admit.    MIPS: Not Applicable       Sepsis/STEMI/Stroke: Lactic acid elevated due to demand. At this time there are no signs of sepsis or septic shock        ED COURSE  11:42 PM I met with the patient, obtained history, performed an initial exam, and discussed options and plan for diagnostics and treatment here in the ED.   2:31 AM I went to update the patient on results and further course of ED action.   3:42 AM I spoke with Dr. Apple about admission.  3:45 AM I went to consent the patient for blood.    MEDICATIONS GIVEN IN THE ED  Medications   HYDROmorphone (PF) (DILAUDID) injection 0.5 mg (0.5 mg Intravenous $Given 6/29/25 2353)   ondansetron (ZOFRAN) injection 4 mg (4 mg Intravenous $Given 6/30/25 0000)   sodium chloride 0.9% BOLUS 1,000 mL (0 mLs Intravenous Stopped 6/30/25 0119)   HYDROmorphone (PF) (DILAUDID) injection 0.5 mg (0.5 mg Intravenous $Given 6/30/25 0116)   iopamidol (ISOVUE-370) solution 90 mL (90 mLs Intravenous $Given 6/30/25 0206)   HYDROmorphone (DILAUDID) injection 1 mg (1 mg Intravenous $Given 6/30/25 0246)   diphenhydrAMINE (BENADRYL) injection 25 mg (25 mg Intravenous $Given 6/30/25 0425)       NEW PRESCRIPTIONS STARTED AT TODAY'S VISIT  New Prescriptions    No medications on file          =================================================================    HPI:    Use of : N/A      Isai Leong is a 69 year old  "male with a history of hypertension who presents abdominal pain.  History provided by patient and family.    The patient reports here with right-sided abdominal pain for the last month or two, worse today.  He notes feeling distended and like his stomach is \"full\".  He also notes nausea.  He tried taking Tylenol, without any relief of his pain.  [facility] and [facility] wanted [facility] his [facility] but US is scheduled for July.  He denies any diarrhea, constipation, urinary symptoms, or other complaints at this time.  Of note, he had previous appendectomy.       RELEVANT HISTORY, MEDICATIONS, & ALLERGIES   Past medical history, surgical history, family history, medications, and allergies reviewed and pertinent noted in HPI.    REVIEW OF SYSTEMS:  A complete review of systems was performed with pertinent positives and negatives noted in the HPI.     PHYSICAL EXAM:    Vitals: BP (!) 160/82   Pulse 62   Temp 97.9  F (36.6  C) (Oral)   Resp 17   SpO2 97%    General: Alert and interactive, comfortable appearing.  HENT: Atraumatic. Full AROM of neck. MMM.  Cardiovascular: Regular rate and rhythm.   Chest/Pulmonary: Normal work of breathing. Speaking in complete sentences. Lungs CTAB. No chest wall tenderness or deformities.  Abdomen: Somewhat firm, mild distension. Diffuse TTP over right abdomen.   Extremities: Normal AROM of all major joints.  Skin: Warm and dry. Normal skin color.   Neuro: Speech clear. CNs grossly intact. Moves all extremities spontaneously.   Psych: Normal affect/mood, cooperative, memory appropriate.      LAB  Labs Ordered and Resulted from Time of ED Arrival to Time of ED Departure   COMPREHENSIVE METABOLIC PANEL - Abnormal       Result Value    Sodium 136      Potassium 3.8      Carbon Dioxide (CO2) 25      Anion Gap 12      Urea Nitrogen 15.3      Creatinine 0.94      GFR Estimate 88      Calcium 8.5 (*)     Chloride 99      Glucose 174 (*)     Alkaline Phosphatase 248 (*)     AST 98 (*) "     ALT 16      Protein Total 6.3 (*)     Albumin 2.8 (*)     Bilirubin Total 0.6     LACTIC ACID WHOLE BLOOD WITH 1X REPEAT IN 2 HR WHEN >2 - Abnormal    Lactic Acid, Initial 3.2 (*)    CBC WITH PLATELETS - Abnormal    WBC Count 7.7      RBC Count 4.60      Hemoglobin 7.6 (*)     Hematocrit 23.0 (*)     MCV 50 (*)     MCH 16.5 (*)     MCHC 33.0      RDW 21.4 (*)     Platelet Count 449     LACTIC ACID WHOLE BLOOD - Abnormal    Lactic Acid 2.4 (*)    CBC WITH PLATELETS - Abnormal    WBC Count 7.2      RBC Count 4.24 (*)     Hemoglobin 7.0 (*)     Hematocrit 21.2 (*)     MCV 50 (*)     MCH 16.5 (*)     MCHC 33.0      RDW 21.2 (*)     Platelet Count 424     RBC AND PLATELET MORPHOLOGY - Abnormal    RBC Morphology Confirmed RBC Indices      Platelet Assessment        Value: Automated Count Confirmed. Platelet morphology is normal.    RBC Fragments Slight (*)     Target Cells Moderate (*)    ROUTINE UA WITH MICROSCOPIC REFLEX TO CULTURE   RBC AND PLATELET MORPHOLOGY   TYPE AND SCREEN, ADULT    ABO/RH(D) B POS      Antibody Screen Negative      SPECIMEN EXPIRATION DATE 7/3/2025 11:59:00 PM CDT     PREPARE RED BLOOD CELLS (UNIT)    Blood Component Type Red Blood Cells      Product Code F3718O14      Unit Status Transfused      Unit Number [id number]      CROSSMATCH Compatible      CODING SYSTEM [id number]      ISSUE DATE AND TIME 6/30/2025  4:12:00 AM CDT      UNIT ABO/RH B+      UNIT TYPE ISBT 7300     PREPARE RED BLOOD CELLS (UNIT)    Blood Component Type Red Blood Cells      Product Code C2800W24      Unit Status Ready for issue      Unit Number [id number]      CROSSMATCH Compatible      CODING SYSTEM [id number]     PREPARE RED BLOOD CELLS (UNIT)   TRANSFUSE RED BLOOD CELLS (UNIT)   ABO/RH TYPE AND SCREEN       RADIOLOGY  CT Abdomen Pelvis w Contrast   Final Result   IMPRESSION:       1.  Lobulated circumferential mass in the ascending colon with possible involvement of the terminal ileum and adjacent  pericolonic/central mesenteric lymphadenopathy.      2.  Multiple metastatic lesions seen throughout the liver.      3.  1 mm nonobstructing left renal stone.            PROCEDURES  None      I, Matt Núñez, am serving as a scribe to document services personally performed by Dr. Genevieve Scherer based on my observation and the provider's statements to me. I, Genevieve Scherer MD attest that Matt Núñez is acting in a scribe capacity, has observed my performance of the services and has documented them in accordance with my direction.    Genevieve Scherer M.D.  Emergency Medicine  [facility] EMERGENCY DEPARTMENT  [address] [place] [id number]  [phone]  Dept: [phone]      Genevieve Scherer MD  06/30/25 0450

## 2025-06-30 NOTE — PLAN OF CARE
"Back from US guided liver biopsy. A little sleepy, pain improved to 5/10 \"pretty good\" declines pain medications. Bandaid dry and intact.  Will be allowed to eat soon- tray ordered. Denies nausea. Wife updated as Isai requested. She will bring in cell phone  when she comes later this afternoon. Chelle Bob, RN    Problem: Adult Inpatient Plan of Care  Goal: Optimal Comfort and Wellbeing  Intervention: Provide Person-Centered Care  Recent Flowsheet Documentation  Taken 6/30/2025 1200 by Chelle Bob, RN  Trust Relationship/Rapport:   care explained   choices provided   empathic listening provided     "

## 2025-06-30 NOTE — PRE-PROCEDURE
GENERAL PRE-PROCEDURE:   Procedure:  Ultrasound guided liver biopsy with moderate sedation  Date/Time:  6/30/2025 12:32 PM    Written consent obtained?: Yes    Risks and benefits: Risks, benefits and alternatives were discussed    Consent given by:  Patient  Patient states understanding of procedure being performed: Yes    Patient's understanding of procedure matches consent: Yes    Procedure consent matches procedure scheduled: Yes    Expected level of sedation:  Moderate  Appropriately NPO:  Yes  ASA Class:  1  Mallampati  :  Grade 1- soft palate, uvula, tonsillar pillars, and posterior pharyngeal wall visible  Lungs:  Lungs clear with good breath sounds bilaterally  Heart:  Normal heart sounds and rate  History & Physical reviewed:  History and physical reviewed and no updates needed  Statement of review:  I have reviewed the lab findings, diagnostic data, medications, and the plan for sedation

## 2025-07-01 PROBLEM — C78.7 METASTATIC COLON CANCER TO LIVER (H): Status: ACTIVE | Noted: 2025-07-01

## 2025-07-01 PROBLEM — C18.9 METASTATIC COLON CANCER TO LIVER (H): Status: ACTIVE | Noted: 2025-07-01

## 2025-07-01 LAB
ACANTHOCYTES BLD QL SMEAR: SLIGHT
ANION GAP SERPL CALCULATED.3IONS-SCNC: 8 MMOL/L (ref 7–15)
BUN SERPL-MCNC: 12.3 MG/DL (ref 8–23)
CALCIUM SERPL-MCNC: 8.5 MG/DL (ref 8.8–10.4)
CHLORIDE SERPL-SCNC: 103 MMOL/L (ref 98–107)
CREAT SERPL-MCNC: 0.74 MG/DL (ref 0.67–1.17)
EGFRCR SERPLBLD CKD-EPI 2021: >90 ML/MIN/1.73M2
ERYTHROCYTE [DISTWIDTH] IN BLOOD BY AUTOMATED COUNT: 31.2 % (ref 10–15)
FRAGMENTS BLD QL SMEAR: ABNORMAL
GLUCOSE SERPL-MCNC: 79 MG/DL (ref 70–99)
HCO3 SERPL-SCNC: 28 MMOL/L (ref 22–29)
HCT VFR BLD AUTO: 28.7 % (ref 40–53)
HGB BLD-MCNC: 9.6 G/DL (ref 13.3–17.7)
MCH RBC QN AUTO: 18.8 PG (ref 26.5–33)
MCHC RBC AUTO-ENTMCNC: 33.4 G/DL (ref 31.5–36.5)
MCV RBC AUTO: 56 FL (ref 78–100)
PLAT MORPH BLD: ABNORMAL
PLATELET # BLD AUTO: 393 10E3/UL (ref 150–450)
POTASSIUM SERPL-SCNC: 4.2 MMOL/L (ref 3.4–5.3)
RBC # BLD AUTO: 5.1 10E6/UL (ref 4.4–5.9)
RBC MORPH BLD: ABNORMAL
SODIUM SERPL-SCNC: 139 MMOL/L (ref 135–145)
TARGETS BLD QL SMEAR: ABNORMAL
WBC # BLD AUTO: 9.6 10E3/UL (ref 4–11)

## 2025-07-01 PROCEDURE — 250N000013 HC RX MED GY IP 250 OP 250 PS 637

## 2025-07-01 PROCEDURE — 80048 BASIC METABOLIC PNL TOTAL CA: CPT

## 2025-07-01 PROCEDURE — 120N000001 HC R&B MED SURG/OB

## 2025-07-01 PROCEDURE — 99232 SBSQ HOSP IP/OBS MODERATE 35: CPT | Mod: GC

## 2025-07-01 PROCEDURE — 250N000011 HC RX IP 250 OP 636: Mod: JW

## 2025-07-01 PROCEDURE — 85027 COMPLETE CBC AUTOMATED: CPT

## 2025-07-01 PROCEDURE — 36415 COLL VENOUS BLD VENIPUNCTURE: CPT

## 2025-07-01 RX ORDER — OXYCODONE HYDROCHLORIDE 5 MG/1
10 TABLET ORAL EVERY 4 HOURS PRN
Refills: 0 | Status: DISCONTINUED | OUTPATIENT
Start: 2025-07-01 | End: 2025-07-02

## 2025-07-01 RX ADMIN — POLYETHYLENE GLYCOL 3350 17 G: 17 POWDER, FOR SOLUTION ORAL at 08:51

## 2025-07-01 RX ADMIN — HYDROMORPHONE HYDROCHLORIDE 0.3 MG: 1 INJECTION, SOLUTION INTRAMUSCULAR; INTRAVENOUS; SUBCUTANEOUS at 20:45

## 2025-07-01 RX ADMIN — OXYCODONE HYDROCHLORIDE 10 MG: 5 TABLET ORAL at 18:58

## 2025-07-01 RX ADMIN — HYDROXYZINE HYDROCHLORIDE 10 MG: 10 TABLET, FILM COATED ORAL at 20:44

## 2025-07-01 RX ADMIN — HYDROMORPHONE HYDROCHLORIDE 0.3 MG: 1 INJECTION, SOLUTION INTRAMUSCULAR; INTRAVENOUS; SUBCUTANEOUS at 13:27

## 2025-07-01 RX ADMIN — HYDROMORPHONE HYDROCHLORIDE 0.3 MG: 1 INJECTION, SOLUTION INTRAMUSCULAR; INTRAVENOUS; SUBCUTANEOUS at 08:58

## 2025-07-01 RX ADMIN — ACETAMINOPHEN 500 MG: 500 TABLET ORAL at 18:57

## 2025-07-01 RX ADMIN — ACETAMINOPHEN 500 MG: 500 TABLET ORAL at 01:31

## 2025-07-01 RX ADMIN — OXYCODONE HYDROCHLORIDE 10 MG: 5 TABLET ORAL at 23:24

## 2025-07-01 RX ADMIN — ACETAMINOPHEN 500 MG: 500 TABLET ORAL at 06:45

## 2025-07-01 RX ADMIN — AMLODIPINE BESYLATE 5 MG: 5 TABLET ORAL at 08:51

## 2025-07-01 RX ADMIN — OXYCODONE HYDROCHLORIDE 5 MG: 5 TABLET ORAL at 06:44

## 2025-07-01 RX ADMIN — ACETAMINOPHEN 500 MG: 500 TABLET ORAL at 13:04

## 2025-07-01 RX ADMIN — AMLODIPINE BESYLATE 5 MG: 5 TABLET ORAL at 20:45

## 2025-07-01 ASSESSMENT — ACTIVITIES OF DAILY LIVING (ADL)
ADLS_ACUITY_SCORE: 53
ADLS_ACUITY_SCORE: 55
ADLS_ACUITY_SCORE: 53
ADLS_ACUITY_SCORE: 51
ADLS_ACUITY_SCORE: 51
ADLS_ACUITY_SCORE: 53
ADLS_ACUITY_SCORE: 53
ADLS_ACUITY_SCORE: 51
ADLS_ACUITY_SCORE: 53
ADLS_ACUITY_SCORE: 55
ADLS_ACUITY_SCORE: 55
ADLS_ACUITY_SCORE: 51
ADLS_ACUITY_SCORE: 55
ADLS_ACUITY_SCORE: 53
ADLS_ACUITY_SCORE: 53
ADLS_ACUITY_SCORE: 51
ADLS_ACUITY_SCORE: 53
ADLS_ACUITY_SCORE: 51
ADLS_ACUITY_SCORE: 55
ADLS_ACUITY_SCORE: 53
ADLS_ACUITY_SCORE: 55

## 2025-07-01 NOTE — PLAN OF CARE
Problem: Adult Inpatient Plan of Care  Goal: Absence of Hospital-Acquired Illness or Injury  Intervention: Identify and Manage Fall Risk  Recent Flowsheet Documentation  Taken 7/1/2025 0800 by Sj Wang RN  Safety Promotion/Fall Prevention:   lighting adjusted   activity supervised  Intervention: Prevent Skin Injury  Recent Flowsheet Documentation  Taken 7/1/2025 0800 by Sj Wang RN  Body Position: position changed independently  Goal: Readiness for Transition of Care  Outcome: Progressing     Problem: Anemia  Goal: Anemia Symptom Improvement  Outcome: Progressing  Intervention: Monitor and Manage Anemia  Recent Flowsheet Documentation  Taken 7/1/2025 0800 by Sj Wang RN  Safety Promotion/Fall Prevention:   lighting adjusted   activity supervised   Goal Outcome Evaluation:       Patient showered with assist of staff, abdominal pain is tolerated with regimen, appetite is good, denies any GI bleed, denies nausea, afebrile, voiding freely with adequate output.    Sj Wang RN

## 2025-07-01 NOTE — PROGRESS NOTES
[facility]    Medicine Progress Note - Hospitalist Service    Date of Admission:  6/29/2025    Assessment & Plan      Isai Leong is a 69 year old male admitted on 6/29/2025. He has PMH of depression, hypertension, headaches and is admitted for chronic right sided abdominal pain found to have a large mass in his colon with likely metastatic liver disease. Remains hospitalized for pain control.     Lobulated Mass of Ascending Colon, with Ileum and Lymph Node Involvement  Metastatic Liver Lesions  Presumed cancer pain  Chronic right abdominal pain with masses as above, CT imaging with very large liver and multiple lesions along with the ascending colon mass. Physical exam consistent with large nodular liver, likely primary cancer of the colon with metastasis to the liver. Weight loss, poor appetite, also suggestive of underlying cancer. S/p liver bx w/IR on 6/30. Remains hospitalized for pain control.   -Pain regimen:   -Abdominal binder per patient request    -Oxycodone 10mg PO q4h PRN   -Dilaudid 0.3mg IV q3h PRN   -Liver bx, pending   -Referral for [facility] on discharge   - Avoid tylenol given liver disease    Microcytic anemia - resolved  MCV 50, Hg 7 on admission, s/p total 2u pRBC. Remains hemodynamically stable and hgb up to 9.6 this AM. Mass in abdomen, may be primary colon cancer. Would be a candidate for colonoscopy.   - Ferritin  - Peripheral smear in process  - CBC daily     Lactic Acidosis  Likely Type A lactic acidosis, improved with fluid resuscitation and will be getting RBC transfusions.   - No further laboratory testing required    Itching   - Hydroxyzine PRN          Diet: Regular Diet Adult    DVT Prophylaxis: VTE Prophylaxis contraindicated due to acute anemia   Her Catheter: Not present  Lines: None     Cardiac Monitoring: None  Code Status: Full Code      Clinically Significant Risk Factors               # Hypoalbuminemia: Lowest albumin = 2.8 g/dL at  6/29/2025 11:50 PM, will monitor as appropriate     # Hypertension: Noted on problem list                       Social Drivers of Health    Tobacco Use: High Risk (5/11/2021)    Received from [employer]    Patient History     Smoking Tobacco Use: Every Day     Smokeless Tobacco Use: Unknown          Disposition Plan   Medically Ready for Discharge: Anticipated Tomorrow           The patient's care was discussed with the Attending Physician, Dr. Hoffman.    Merlin Guevara MD  Hospitalist Service  [facility]  Securely message with [employer] (more info)  Text page via [email or web address]) Paging/Directory   ______________________________________________________________________    Interval History   Reports of a fall overnight. No bruising visualized on exposed body parts. He had many questions about cancer and treatment. Reports that abdominal pain is well-controlled on current oxycodone, but just doesn't last long enough.     Physical Exam   Vital Signs: Temp: 97.3  F (36.3  C) Temp src: Oral BP: (!) 192/96 (RN noted) Pulse: 87   Resp: 20 SpO2: 97 % O2 Device: None (Room air)    Weight: 135 lbs 0 oz    Physical Exam  Constitutional:       General: He is not in acute distress.     Appearance: He is not toxic-appearing.   HENT:      Head: Normocephalic and atraumatic.   Pulmonary:      Effort: Pulmonary effort is normal.   Abdominal:      General: There is distension.      Tenderness: There is abdominal tenderness. There is guarding and rebound.   Musculoskeletal:      Right lower leg: No edema.      Left lower leg: No edema.   Neurological:      General: No focal deficit present.      Mental Status: He is alert and oriented to person, place, and time.   Psychiatric:         Mood and Affect: Mood normal.         Behavior: Behavior normal.           Medical Decision Making       ------------------ MEDICAL DECISION MAKING  ------------------------------------------------------------------------------------------------------      Data   ------------------------- PAST 24 HR DATA REVIEWED -----------------------------------------------

## 2025-07-01 NOTE — PLAN OF CARE
Alert and oriented. 1 assist, weak. Reports falling this morning in the ed. Said bed rail was down and rolled over and fell out onto right shoulder and right hip. Did not notify anybody. Said no pain at all and there is no sign of injury. Pain is at right lower abdomen and some distention is there and doctor came and looked at it. At rest pain is more tolerable but when moving it comes back. IV dilaudid effective. Itching helped with lotion. Urine sample obtained. Appetite okay and ate well today, but appetite has been very poor at home. Pain has made it very difficult to do adls at home as well.

## 2025-07-01 NOTE — PLAN OF CARE
Problem: Adult Inpatient Plan of Care  Goal: Plan of Care Review  Description: The Plan of Care Review/Shift note should be completed every shift.  The Outcome Evaluation is a brief statement about your assessment that the patient is improving, declining, or no change.  This information will be displayed automatically on your shift  note.  Outcome: Progressing   Goal Outcome Evaluation:         Schedule tylenol given and oxycodone for pain. Up to toilet with assist x1,

## 2025-07-01 NOTE — PLAN OF CARE
Problem: Pain Chronic (Persistent)  Goal: Optimal Pain Control and Function  Outcome: Progressing   Goal Outcome Evaluation:       Pt is AxO and able to make needs known. Pt's pain was managed with both scheduled Tylenol and prn Dilaudid with some effects. PT had a good lunch. Pt c/o room being hot, store room was called and fan was brought for Pt. Pt had visitors at bedside. Call light within reach and Pt calls appropriately.

## 2025-07-01 NOTE — PROGRESS NOTES
Dual Skin Assessment Note:    Patient transferred from ED from to P2.     Comprehensive skin inspection completed by myself and Riana Deleon RN.     Abnormal skin assessment findings: dryness in arms and back and feet    LDA Initiated for skin breakdown/non-blanchable redness: Not applicable    Provider notified: Not applicable    If yes, WOC Consult order obtained: No    Odilon Watson, RN 9:55 PM

## 2025-07-02 LAB
ACANTHOCYTES BLD QL SMEAR: SLIGHT
ANION GAP SERPL CALCULATED.3IONS-SCNC: 6 MMOL/L (ref 7–15)
BUN SERPL-MCNC: 9.9 MG/DL (ref 8–23)
CALCIUM SERPL-MCNC: 8.8 MG/DL (ref 8.8–10.4)
CEA SERPL-MCNC: 5.8 NG/ML
CHLORIDE SERPL-SCNC: 98 MMOL/L (ref 98–107)
CREAT SERPL-MCNC: 0.81 MG/DL (ref 0.67–1.17)
EGFRCR SERPLBLD CKD-EPI 2021: >90 ML/MIN/1.73M2
ERYTHROCYTE [DISTWIDTH] IN BLOOD BY AUTOMATED COUNT: 32.4 % (ref 10–15)
FRAGMENTS BLD QL SMEAR: ABNORMAL
GLUCOSE SERPL-MCNC: 73 MG/DL (ref 70–99)
HCO3 SERPL-SCNC: 32 MMOL/L (ref 22–29)
HCT VFR BLD AUTO: 28.7 % (ref 40–53)
HGB BLD-MCNC: 9.8 G/DL (ref 13.3–17.7)
MCH RBC QN AUTO: 19 PG (ref 26.5–33)
MCHC RBC AUTO-ENTMCNC: 34.1 G/DL (ref 31.5–36.5)
MCV RBC AUTO: 56 FL (ref 78–100)
PLAT MORPH BLD: ABNORMAL
PLATELET # BLD AUTO: 404 10E3/UL (ref 150–450)
POTASSIUM SERPL-SCNC: 4.4 MMOL/L (ref 3.4–5.3)
RBC # BLD AUTO: 5.17 10E6/UL (ref 4.4–5.9)
RBC MORPH BLD: ABNORMAL
SODIUM SERPL-SCNC: 136 MMOL/L (ref 135–145)
TARGETS BLD QL SMEAR: ABNORMAL
WBC # BLD AUTO: 9.9 10E3/UL (ref 4–11)

## 2025-07-02 PROCEDURE — 88333 PATH CONSLTJ SURG CYTO XM 1: CPT | Mod: 26 | Performed by: PATHOLOGY

## 2025-07-02 PROCEDURE — 250N000013 HC RX MED GY IP 250 OP 250 PS 637

## 2025-07-02 PROCEDURE — 88342 IMHCHEM/IMCYTCHM 1ST ANTB: CPT | Mod: 26 | Performed by: PATHOLOGY

## 2025-07-02 PROCEDURE — 82378 CARCINOEMBRYONIC ANTIGEN: CPT

## 2025-07-02 PROCEDURE — 88360 TUMOR IMMUNOHISTOCHEM/MANUAL: CPT | Mod: 26 | Performed by: PATHOLOGY

## 2025-07-02 PROCEDURE — 36415 COLL VENOUS BLD VENIPUNCTURE: CPT

## 2025-07-02 PROCEDURE — 120N000001 HC R&B MED SURG/OB

## 2025-07-02 PROCEDURE — 80048 BASIC METABOLIC PNL TOTAL CA: CPT

## 2025-07-02 PROCEDURE — 85018 HEMOGLOBIN: CPT

## 2025-07-02 PROCEDURE — 88341 IMHCHEM/IMCYTCHM EA ADD ANTB: CPT | Mod: 26 | Performed by: PATHOLOGY

## 2025-07-02 PROCEDURE — 99232 SBSQ HOSP IP/OBS MODERATE 35: CPT | Mod: GC

## 2025-07-02 PROCEDURE — 88305 TISSUE EXAM BY PATHOLOGIST: CPT | Mod: 26 | Performed by: PATHOLOGY

## 2025-07-02 RX ORDER — OXYCODONE HYDROCHLORIDE 15 MG/1
15 TABLET ORAL EVERY 4 HOURS PRN
Refills: 0 | Status: DISCONTINUED | OUTPATIENT
Start: 2025-07-02 | End: 2025-07-03

## 2025-07-02 RX ADMIN — AMLODIPINE BESYLATE 5 MG: 5 TABLET ORAL at 20:26

## 2025-07-02 RX ADMIN — OXYCODONE HYDROCHLORIDE 15 MG: 15 TABLET ORAL at 17:15

## 2025-07-02 RX ADMIN — ACETAMINOPHEN 500 MG: 500 TABLET ORAL at 08:53

## 2025-07-02 RX ADMIN — POLYETHYLENE GLYCOL 3350 17 G: 17 POWDER, FOR SOLUTION ORAL at 08:53

## 2025-07-02 RX ADMIN — ACETAMINOPHEN 500 MG: 500 TABLET ORAL at 00:52

## 2025-07-02 RX ADMIN — OXYCODONE HYDROCHLORIDE 10 MG: 5 TABLET ORAL at 08:52

## 2025-07-02 RX ADMIN — AMLODIPINE BESYLATE 5 MG: 5 TABLET ORAL at 08:53

## 2025-07-02 RX ADMIN — ACETAMINOPHEN 500 MG: 500 TABLET ORAL at 12:50

## 2025-07-02 RX ADMIN — ACETAMINOPHEN 500 MG: 500 TABLET ORAL at 20:27

## 2025-07-02 ASSESSMENT — ACTIVITIES OF DAILY LIVING (ADL)
ADLS_ACUITY_SCORE: 55

## 2025-07-02 NOTE — PLAN OF CARE
Problem: Anemia  Goal: Anemia Symptom Improvement  Outcome: Progressing  Intervention: Monitor and Manage Anemia  Recent Flowsheet Documentation  Taken 7/2/2025 0050 by Mahnaz Brown RN  Safety Promotion/Fall Prevention:   patient and family education   nonskid shoes/slippers when out of bed   activity supervised   assistive device/personal items within reach   supervised activity   safety round/check completed  Taken 7/1/2025 2050 by Mahnaz Brown RN  Safety Promotion/Fall Prevention:   patient and family education   nonskid shoes/slippers when out of bed   activity supervised   assistive device/personal items within reach   supervised activity   safety round/check completed     Problem: Comorbidity Management  Goal: Maintenance of Behavioral Health Symptom Control  Outcome: Progressing  Goal: Blood Pressure in Desired Range  Outcome: Progressing     Problem: Pain Chronic (Persistent)  Goal: Optimal Pain Control and Function  Outcome: Progressing     Problem: Pain Acute  Goal: Optimal Pain Control and Function  Outcome: Progressing   Goal Outcome Evaluation:  Patient AOX4. RA. Patient tolerating diet well. Pain managed with prn medications. Patient has been sleeping most of night. Wife at bedside.

## 2025-07-02 NOTE — PROGRESS NOTES
[facility]    Medicine Progress Note - Hospitalist Service    Date of Admission:  6/29/2025    Assessment & Plan      Isai Leong is a 69 year old male admitted on 6/29/2025. He has PMH of depression, hypertension, headaches and is admitted for chronic right sided abdominal pain found to have a large mass in his colon with likely metastatic liver disease. Remains hospitalized for pain control.     Lobulated Mass of Ascending Colon, with Ileum and Lymph Node Involvement  Metastatic Liver Lesions  Presumed cancer pain  Chronic right abdominal pain with masses as above, CT imaging with very large liver and multiple lesions along with the ascending colon mass. Physical exam consistent with large nodular liver, likely primary cancer of the colon with metastasis to the liver. Weight loss, poor appetite, also suggestive of underlying cancer. S/p liver bx w/IR on 6/30. Remains hospitalized for pain control.   -Pain regimen:   -Goal pain level 5/10   -Abdominal binder per patient request    -Oxycodone 10mg >> 15mg PO q4h PRN   -Dilaudid 0.3mg IV q3h PRN   -Liver bx showing colon cancer w/mets, genotype still pending  -Add-on CEA   -Hold on CT chest per oncology curbside   -Referral for [facility] on discharge   - Avoid tylenol given liver disease    R inguinal hernia  Enlarged, but reducible today in setting of colon cancer w/mets discussed above. Not noted on CT AP on admission.   -Continue to monitor   -Outpatient surgical follow-up if remains benign  -Pain management per above    Microcytic anemia - resolved  MCV 50, Hg 7 on admission, s/p total 2u pRBC. Remains hemodynamically stable and hgb between 9-10. Mass in abdomen, may be primary colon cancer. Would be a candidate for colonoscopy.   - Ferritin  - Peripheral smear in process  - CBC if becomes hemodynamically unstable     Lactic Acidosis  Likely Type A lactic acidosis, improved with fluid resuscitation and will be getting RBC  transfusions.   - No further laboratory testing required    Itching   - Hydroxyzine PRN          Diet: Regular Diet Adult    DVT Prophylaxis: VTE Prophylaxis contraindicated due to acute anemia   Her Catheter: Not present  Lines: None     Cardiac Monitoring: None  Code Status: Full Code      Clinically Significant Risk Factors               # Hypoalbuminemia: Lowest albumin = 2.8 g/dL at 6/29/2025 11:50 PM, will monitor as appropriate     # Hypertension: Noted on problem list                       Social Drivers of Health    Tobacco Use: High Risk (5/11/2021)    Received from [employer]    Patient History     Smoking Tobacco Use: Every Day     Smokeless Tobacco Use: Unknown   Interpersonal Safety: High Risk (7/1/2025)    Interpersonal Safety     Do you feel physically and emotionally safe where you currently live?: No     Within the past 12 months, have you been hit, slapped, kicked or otherwise physically hurt by someone?: No     Within the past 12 months, have you been humiliated or emotionally abused in other ways by your partner or ex-partner?: No          Disposition Plan   Medically Ready for Discharge: Anticipated Tomorrow           The patient's care was discussed with the Attending Physician, Dr. Hoffman.    Merlin Guevara MD  Hospitalist Service  [facility]  Securely message with [employer] (more info)  Text page via [employer] Paging/Directory   ______________________________________________________________________    Interval History   NAOE. Reports 7-9/10 abdominal pain. Increase in oxycodone helpful, but still not adequate. Reports his goal pain level is 5/10 so he can move around more comfortably. BM x1 overnight. Still having poor PO intake - picks a lot as his food. Denies dyspnea.     Physical Exam   Vital Signs: Temp: 98.5  F (36.9  C) Temp src: Oral BP: (!) 157/88 Pulse: 88   Resp: 18 SpO2: 98 % O2 Device: None (Room air)    Weight: 135 lbs 0 oz    Physical  Exam  Constitutional:       General: He is not in acute distress.     Appearance: He is not toxic-appearing.   HENT:      Head: Normocephalic and atraumatic.   Pulmonary:      Effort: Pulmonary effort is normal.   Abdominal:      General: There is distension.      Tenderness: There is abdominal tenderness. There is guarding and rebound.   Genitourinary:     Comments: Enlarged, but reducible inguinal hernia R groin.   Musculoskeletal:      Right lower leg: No edema.      Left lower leg: No edema.   Neurological:      General: No focal deficit present.      Mental Status: He is alert and oriented to person, place, and time.   Psychiatric:         Mood and Affect: Mood normal.         Behavior: Behavior normal.           Medical Decision Making       ------------------ MEDICAL DECISION MAKING ------------------------------------------------------------------------------------------------------      Data   ------------------------- PAST 24 HR DATA REVIEWED -----------------------------------------------

## 2025-07-02 NOTE — PLAN OF CARE
Problem: Comorbidity Management  Goal: Maintenance of Behavioral Health Symptom Control  Outcome: Progressing     Problem: Pain Chronic (Persistent)  Goal: Optimal Pain Control and Function  Outcome: Progressing     Problem: Pain Acute  Goal: Optimal Pain Control and Function  Outcome: Progressing     Problem: Anemia  Goal: Anemia Symptom Improvement  Intervention: Monitor and Manage Anemia  Recent Flowsheet Documentation  Taken 7/2/2025 0809 by Sj Wang RN  Safety Promotion/Fall Prevention:   patient and family education   nonskid shoes/slippers when out of bed   activity supervised   assistive device/personal items within reach   supervised activity   safety round/check completed   Goal Outcome Evaluation:       Patient makes needs  known appropriately, independent in the room with mobility, up to the bathroom ad heidi, voiding freely with adequate output as per patient. Patient very pleased with caregivers here at [facility]. Pain is tolerable with present regimen, appetite is fair, increase in mobility encouraged. Patient denies nausea and vomiting, denies any bloody stools, afebrile VSS.    Sj Wang RN

## 2025-07-02 NOTE — CARE PLAN
"Isai's wife is with the patient sleeping in the room.  She requested to speak to a supervisor re: limiting visitors (his family). I introduced myself and listened to her concerns.  I explained that Isai is his own decision maker and that he would need to make that request himself.  Isai was involved in the conversation and told his wife \"you don't have to worry about that, It will be alright\".  I asked Isai if he had concerns about visitors and he said he had no concerns.  I informed Isai that if he changed his mind or had concerns to reach out to his nurse.  "

## 2025-07-03 VITALS
WEIGHT: 125.44 LBS | BODY MASS INDEX: 17.96 KG/M2 | TEMPERATURE: 98.9 F | OXYGEN SATURATION: 99 % | HEIGHT: 70 IN | SYSTOLIC BLOOD PRESSURE: 149 MMHG | RESPIRATION RATE: 20 BRPM | DIASTOLIC BLOOD PRESSURE: 88 MMHG | HEART RATE: 72 BPM

## 2025-07-03 LAB
CREAT SERPL-MCNC: 0.81 MG/DL (ref 0.67–1.17)
EGFRCR SERPLBLD CKD-EPI 2021: >90 ML/MIN/1.73M2
MCV RBC AUTO: 56 FL (ref 78–100)
PATH REPORT.ADDENDUM SPEC: ABNORMAL
PATH REPORT.COMMENTS IMP SPEC: ABNORMAL
PATH REPORT.COMMENTS IMP SPEC: YES
PATH REPORT.FINAL DX SPEC: ABNORMAL
PATH REPORT.GROSS SPEC: ABNORMAL
PATH REPORT.MICROSCOPIC SPEC OTHER STN: ABNORMAL
PATH REPORT.RELEVANT HX SPEC: ABNORMAL
PATHOLOGY SYNOPTIC REPORT: ABNORMAL
PHOTO IMAGE: ABNORMAL
PLATELET # BLD AUTO: 424 10E3/UL (ref 150–450)
POTASSIUM SERPL-SCNC: 4.7 MMOL/L (ref 3.4–5.3)

## 2025-07-03 PROCEDURE — 250N000011 HC RX IP 250 OP 636

## 2025-07-03 PROCEDURE — 250N000013 HC RX MED GY IP 250 OP 250 PS 637

## 2025-07-03 PROCEDURE — 99232 SBSQ HOSP IP/OBS MODERATE 35: CPT | Mod: GC

## 2025-07-03 PROCEDURE — 36415 COLL VENOUS BLD VENIPUNCTURE: CPT | Performed by: FAMILY MEDICINE

## 2025-07-03 PROCEDURE — 84132 ASSAY OF SERUM POTASSIUM: CPT | Performed by: FAMILY MEDICINE

## 2025-07-03 PROCEDURE — 120N000001 HC R&B MED SURG/OB

## 2025-07-03 PROCEDURE — 85049 AUTOMATED PLATELET COUNT: CPT

## 2025-07-03 RX ORDER — OXYCODONE HYDROCHLORIDE 5 MG/1
10 TABLET ORAL EVERY 4 HOURS PRN
Refills: 0 | Status: ACTIVE | OUTPATIENT
Start: 2025-07-03

## 2025-07-03 RX ORDER — OXYCODONE HYDROCHLORIDE 15 MG/1
15 TABLET ORAL EVERY 4 HOURS
Refills: 0 | Status: DISCONTINUED | OUTPATIENT
Start: 2025-07-03 | End: 2025-07-03

## 2025-07-03 RX ORDER — OXYCODONE HYDROCHLORIDE 5 MG/1
5 TABLET ORAL EVERY 4 HOURS PRN
Refills: 0 | Status: ACTIVE | OUTPATIENT
Start: 2025-07-03

## 2025-07-03 RX ORDER — HYDROMORPHONE HYDROCHLORIDE 1 MG/ML
0.3 INJECTION, SOLUTION INTRAMUSCULAR; INTRAVENOUS; SUBCUTANEOUS
Status: DISPENSED | OUTPATIENT
Start: 2025-07-03

## 2025-07-03 RX ORDER — OXYCODONE HYDROCHLORIDE 5 MG/1
10 TABLET ORAL EVERY 6 HOURS
Refills: 0 | Status: DISPENSED | OUTPATIENT
Start: 2025-07-03

## 2025-07-03 RX ADMIN — POLYETHYLENE GLYCOL 3350 17 G: 17 POWDER, FOR SOLUTION ORAL at 08:03

## 2025-07-03 RX ADMIN — HYDROMORPHONE HYDROCHLORIDE 0.3 MG: 1 INJECTION, SOLUTION INTRAMUSCULAR; INTRAVENOUS; SUBCUTANEOUS at 22:35

## 2025-07-03 RX ADMIN — ACETAMINOPHEN 500 MG: 500 TABLET ORAL at 03:12

## 2025-07-03 RX ADMIN — HYDROMORPHONE HYDROCHLORIDE 0.3 MG: 1 INJECTION, SOLUTION INTRAMUSCULAR; INTRAVENOUS; SUBCUTANEOUS at 19:38

## 2025-07-03 RX ADMIN — ACETAMINOPHEN 500 MG: 500 TABLET ORAL at 08:03

## 2025-07-03 RX ADMIN — HYDROMORPHONE HYDROCHLORIDE 0.3 MG: 1 INJECTION, SOLUTION INTRAMUSCULAR; INTRAVENOUS; SUBCUTANEOUS at 08:02

## 2025-07-03 RX ADMIN — ENOXAPARIN SODIUM 40 MG: 40 INJECTION SUBCUTANEOUS at 08:03

## 2025-07-03 RX ADMIN — AMLODIPINE BESYLATE 5 MG: 5 TABLET ORAL at 08:03

## 2025-07-03 RX ADMIN — ACETAMINOPHEN 500 MG: 500 TABLET ORAL at 13:07

## 2025-07-03 RX ADMIN — OXYCODONE HYDROCHLORIDE 10 MG: 5 TABLET ORAL at 18:26

## 2025-07-03 RX ADMIN — OXYCODONE HYDROCHLORIDE 15 MG: 15 TABLET ORAL at 04:44

## 2025-07-03 RX ADMIN — HYDROXYZINE HYDROCHLORIDE 10 MG: 10 TABLET, FILM COATED ORAL at 22:33

## 2025-07-03 RX ADMIN — ONDANSETRON 4 MG: 4 TABLET, ORALLY DISINTEGRATING ORAL at 19:53

## 2025-07-03 RX ADMIN — ACETAMINOPHEN 500 MG: 500 TABLET ORAL at 19:37

## 2025-07-03 RX ADMIN — OXYCODONE HYDROCHLORIDE 10 MG: 5 TABLET ORAL at 13:06

## 2025-07-03 RX ADMIN — OXYCODONE HYDROCHLORIDE 15 MG: 15 TABLET ORAL at 00:29

## 2025-07-03 RX ADMIN — AMLODIPINE BESYLATE 5 MG: 5 TABLET ORAL at 19:51

## 2025-07-03 ASSESSMENT — ACTIVITIES OF DAILY LIVING (ADL)
ADLS_ACUITY_SCORE: 55
ADLS_ACUITY_SCORE: 63
ADLS_ACUITY_SCORE: 55
ADLS_ACUITY_SCORE: 55
ADLS_ACUITY_SCORE: 63
ADLS_ACUITY_SCORE: 55
ADLS_ACUITY_SCORE: 63
ADLS_ACUITY_SCORE: 55
ADLS_ACUITY_SCORE: 63
ADLS_ACUITY_SCORE: 55
ADLS_ACUITY_SCORE: 55
ADLS_ACUITY_SCORE: 63

## 2025-07-03 NOTE — PLAN OF CARE
Problem: Adult Inpatient Plan of Care  Goal: Optimal Comfort and Wellbeing  Outcome: Progressing  Intervention: Monitor Pain and Promote Comfort  Recent Flowsheet Documentation  Taken 7/3/2025 0444 by Елена Collazo, RN  Pain Management Interventions: medication (see MAR)  Taken 7/3/2025 0029 by Елена Collazo, RN  Pain Management Interventions: medication (see MAR)   Goal Outcome Evaluation:       Pt is alert and oriented, able to make needs known. PRN oxycodone given for abdominal pain along with scheduled tylenol. Per pt the tylenol doesn't do anything.Offered pt dilaudid but he declined. Pt resting inbetween cares.  Елена Collazo, RN

## 2025-07-03 NOTE — PLAN OF CARE
Pt up independently.  PRN IV Dilaudid given this AM.   Pt reports pain to be 8-9/10. MD notified.   New orders for scheduled Oxycodone, in addition to prn for pain.  Potassium 4.7, no need for replacement per protocol.  Potential discharge tomorrow.

## 2025-07-03 NOTE — PROGRESS NOTES
[facility]    Medicine Progress Note - Hospitalist Service    Date of Admission:  6/29/2025    Assessment & Plan      Isai Leong is a 69 year old male admitted on 6/29/2025. He has PMH of depression, hypertension, headaches and is admitted for chronic right sided abdominal pain found to have a large mass in his colon with likely metastatic liver disease. Remains hospitalized for pain control.     Lobulated Mass of Ascending Colon, with Ileum and Lymph Node Involvement  Metastatic Liver Lesions  Presumed cancer pain  Chronic right abdominal pain with masses as above, CT imaging with very large liver and multiple lesions along with the ascending colon mass. Physical exam consistent with large nodular liver, likely primary cancer of the colon with metastasis to the liver. Weight loss, poor appetite, also suggestive of underlying cancer. S/p liver bx w/IR on 6/30. Pain control remains suboptimal - unable to participate in ADL's w/o significant pain.   -Pain regimen:   -Goal pain level 5/10   -Abdominal binder per patient request    -Oxycodone 10mg q6h scheduled   -Oxycodone 5-10mg q4h PRN   -Dilaudid 0.3mg IV q3h >> q2h PRN    - Avoid tylenol given liver disease   - Avoid NSAIDs given GI bleed  -Discussed w/PharmD. Consider switching to oxycontin once daily MME determined. Start oxycontin at 50 - % of total daily MME, then add breakthrough oxycodone PRNs?  -Consider palliative and/or pain consult  -Liver bx showing colon cancer w/mets, genotype pending  -Add-on CEA for outpatient oncology follow-up  -Hold on CT chest per oncology curbside   -Referral for [facility] on discharge     R inguinal hernia  Enlarged, but reducible today in setting of colon cancer w/mets discussed above. Not noted on CT AP on admission.   -Continue to monitor   -Outpatient surgical follow-up if remains benign  -Pain management per above    Microcytic anemia - resolved  MCV 50, Hg 7 on admission, s/p  total 2u pRBC. Remains hemodynamically stable and hgb between 9-10. Mass in abdomen, may be primary colon cancer. Would be a candidate for colonoscopy.   - Ferritin  - Peripheral smear in process  - CBC if becomes hemodynamically unstable     Lactic Acidosis  Likely Type A lactic acidosis, improved with fluid resuscitation and will be getting RBC transfusions.   - No further laboratory testing required    Itching   - Hydroxyzine PRN          Diet: Regular Diet Adult  Snacks/Supplements Adult: Glucerna; With Meals  Snacks/Supplements Adult: Ensure Plus High Protein; With Meals    DVT Prophylaxis: VTE Prophylaxis contraindicated due to acute anemia   Her Catheter: Not present  Lines: None     Cardiac Monitoring: None  Code Status: Full Code      Clinically Significant Risk Factors               # Hypoalbuminemia: Lowest albumin = 2.8 g/dL at 6/29/2025 11:50 PM, will monitor as appropriate     # Hypertension: Noted on problem list                       Social Drivers of Health    Tobacco Use: High Risk (5/11/2021)    Received from [employer]    Patient History     Smoking Tobacco Use: Every Day     Smokeless Tobacco Use: Unknown   Interpersonal Safety: High Risk (7/1/2025)    Interpersonal Safety     Do you feel physically and emotionally safe where you currently live?: No     Within the past 12 months, have you been hit, slapped, kicked or otherwise physically hurt by someone?: No     Within the past 12 months, have you been humiliated or emotionally abused in other ways by your partner or ex-partner?: No          Disposition Plan   Medically Ready for Discharge: Anticipated Tomorrow           The patient's care was discussed with the Attending Physician, Dr. Hoffman.    Merlin Guevara MD  Hospitalist Service  [facility]  Securely message with [employer] (more info)  Text page via [employer] Paging/Directory   ______________________________________________________________________    Interval  History   PETE Continues to have 8/10 even w/increase in oxycodone. Reports that perhaps he overexerted himself, yesterday evening worsening abdominal pain.     Physical Exam   Vital Signs: Temp: 98.2  F (36.8  C) Temp src: Oral BP: (!) 145/76 Pulse: 68   Resp: 16 SpO2: 97 % O2 Device: None (Room air)    Weight: 135 lbs 0 oz    Physical Exam  Constitutional:       General: He is not in acute distress.     Appearance: He is not toxic-appearing.   HENT:      Head: Normocephalic and atraumatic.   Pulmonary:      Effort: Pulmonary effort is normal.   Abdominal:      General: There is distension.      Tenderness: There is abdominal tenderness. There is guarding and rebound.   Genitourinary:     Comments: Enlarged, but reducible inguinal hernia R groin.   Musculoskeletal:      Right lower leg: No edema.      Left lower leg: No edema.   Neurological:      General: No focal deficit present.      Mental Status: He is alert and oriented to person, place, and time.   Psychiatric:         Mood and Affect: Mood normal.         Behavior: Behavior normal.           Medical Decision Making       ------------------ MEDICAL DECISION MAKING ------------------------------------------------------------------------------------------------------      Data   ------------------------- PAST 24 HR DATA REVIEWED -----------------------------------------------

## 2025-07-03 NOTE — PLAN OF CARE
Problem: Comorbidity Management  Goal: Blood Pressure in Desired Range  Outcome: Not Progressing     Problem: Pain Acute  Goal: Optimal Pain Control and Function  Outcome: Progressing  Intervention: Develop Pain Management Plan  Recent Flowsheet Documentation  Taken 7/2/2025 1715 by Jhoana Gandhi RN  Pain Management Interventions: medication (see MAR)   Goal Outcome Evaluation:      Bp 160/94, was rechecked at hs prior to hs bp med for 138/68, C/O lower abdominal pain, prn oxycodone was helpful, had good food and fluid intake, alert and oriented, went out for a walk via wheelchair with family, no nausea, no emesis, had good food and fluid intake..

## 2025-07-04 LAB — POTASSIUM SERPL-SCNC: 4.9 MMOL/L (ref 3.4–5.3)

## 2025-07-04 PROCEDURE — 84132 ASSAY OF SERUM POTASSIUM: CPT

## 2025-07-04 PROCEDURE — 36415 COLL VENOUS BLD VENIPUNCTURE: CPT

## 2025-07-04 PROCEDURE — 120N000001 HC R&B MED SURG/OB

## 2025-07-04 PROCEDURE — 250N000013 HC RX MED GY IP 250 OP 250 PS 637

## 2025-07-04 PROCEDURE — 250N000011 HC RX IP 250 OP 636

## 2025-07-04 RX ORDER — METHOCARBAMOL 750 MG/1
750 TABLET, FILM COATED ORAL EVERY 6 HOURS
Status: DISCONTINUED | OUTPATIENT
Start: 2025-07-04 | End: 2025-07-05

## 2025-07-04 RX ORDER — CETIRIZINE HYDROCHLORIDE 10 MG/1
10 TABLET ORAL ONCE
Status: COMPLETED | OUTPATIENT
Start: 2025-07-04 | End: 2025-07-04

## 2025-07-04 RX ORDER — METHOCARBAMOL 500 MG/1
500 TABLET, FILM COATED ORAL EVERY 6 HOURS
Status: DISCONTINUED | OUTPATIENT
Start: 2025-07-04 | End: 2025-07-04

## 2025-07-04 RX ORDER — CETIRIZINE HYDROCHLORIDE 5 MG/1
5 TABLET ORAL DAILY PRN
Status: DISCONTINUED | OUTPATIENT
Start: 2025-07-04 | End: 2025-07-12 | Stop reason: HOSPADM

## 2025-07-04 RX ORDER — POLYETHYLENE GLYCOL 3350 17 G/17G
17 POWDER, FOR SOLUTION ORAL 2 TIMES DAILY
Status: DISCONTINUED | OUTPATIENT
Start: 2025-07-04 | End: 2025-07-12 | Stop reason: HOSPADM

## 2025-07-04 RX ADMIN — ACETAMINOPHEN 500 MG: 500 TABLET ORAL at 19:54

## 2025-07-04 RX ADMIN — METHOCARBAMOL 750 MG: 750 TABLET ORAL at 11:55

## 2025-07-04 RX ADMIN — ACETAMINOPHEN 500 MG: 500 TABLET ORAL at 13:40

## 2025-07-04 RX ADMIN — OXYCODONE HYDROCHLORIDE 10 MG: 5 TABLET ORAL at 00:20

## 2025-07-04 RX ADMIN — AMLODIPINE BESYLATE 5 MG: 5 TABLET ORAL at 19:56

## 2025-07-04 RX ADMIN — ACETAMINOPHEN 500 MG: 500 TABLET ORAL at 00:20

## 2025-07-04 RX ADMIN — AMLODIPINE BESYLATE 5 MG: 5 TABLET ORAL at 08:45

## 2025-07-04 RX ADMIN — OXYCODONE HYDROCHLORIDE 12.5 MG: 5 TABLET ORAL at 11:55

## 2025-07-04 RX ADMIN — HYDROMORPHONE HYDROCHLORIDE 0.3 MG: 1 INJECTION, SOLUTION INTRAMUSCULAR; INTRAVENOUS; SUBCUTANEOUS at 03:24

## 2025-07-04 RX ADMIN — OXYCODONE HYDROCHLORIDE 12.5 MG: 5 TABLET ORAL at 18:33

## 2025-07-04 RX ADMIN — ENOXAPARIN SODIUM 40 MG: 40 INJECTION SUBCUTANEOUS at 08:45

## 2025-07-04 RX ADMIN — ACETAMINOPHEN 500 MG: 500 TABLET ORAL at 07:08

## 2025-07-04 RX ADMIN — HYDROMORPHONE HYDROCHLORIDE 0.3 MG: 1 INJECTION, SOLUTION INTRAMUSCULAR; INTRAVENOUS; SUBCUTANEOUS at 20:05

## 2025-07-04 RX ADMIN — POLYETHYLENE GLYCOL 3350 17 G: 17 POWDER, FOR SOLUTION ORAL at 08:45

## 2025-07-04 RX ADMIN — POLYETHYLENE GLYCOL 3350 17 G: 17 POWDER, FOR SOLUTION ORAL at 20:11

## 2025-07-04 RX ADMIN — HYDROMORPHONE HYDROCHLORIDE 0.3 MG: 1 INJECTION, SOLUTION INTRAMUSCULAR; INTRAVENOUS; SUBCUTANEOUS at 13:47

## 2025-07-04 RX ADMIN — CETIRIZINE HYDROCHLORIDE 10 MG: 10 TABLET, FILM COATED ORAL at 11:55

## 2025-07-04 RX ADMIN — HYDROMORPHONE HYDROCHLORIDE 0.3 MG: 1 INJECTION, SOLUTION INTRAMUSCULAR; INTRAVENOUS; SUBCUTANEOUS at 09:04

## 2025-07-04 RX ADMIN — HYDROMORPHONE HYDROCHLORIDE 0.3 MG: 1 INJECTION, SOLUTION INTRAMUSCULAR; INTRAVENOUS; SUBCUTANEOUS at 16:42

## 2025-07-04 RX ADMIN — METHOCARBAMOL 750 MG: 750 TABLET ORAL at 18:34

## 2025-07-04 RX ADMIN — OXYCODONE HYDROCHLORIDE 10 MG: 5 TABLET ORAL at 07:08

## 2025-07-04 ASSESSMENT — ACTIVITIES OF DAILY LIVING (ADL)
ADLS_ACUITY_SCORE: 63

## 2025-07-04 NOTE — PLAN OF CARE
Pt continues with right sided abdominal pain. Palliative care consult ordered. Scheduled oxycodone dose increased. New order for scheduled methocarbamal. Pt also received prn dilaudid x2.  K+ 4.9. No replacement needed. Re-check tomorrow am per protocol.

## 2025-07-04 NOTE — PROGRESS NOTES
[facility]    Medicine Progress Note - Hospitalist Service    Date of Admission:  6/29/2025    Assessment & Plan      Isai Leong is a 69 year old male admitted on 6/29/2025. He has PMH of depression, hypertension, headaches and is admitted for chronic right sided abdominal pain found to have a large mass in his colon with likely metastatic liver disease. Remains hospitalized for pain control.     Lobulated Mass of Ascending Colon, with Ileum and Lymph Node Involvement  Metastatic Liver Lesions  Presumed cancer pain  Chronic right abdominal pain with masses as above, CT imaging with very large liver and multiple lesions along with the ascending colon mass. Physical exam consistent with large nodular liver, likely primary cancer of the colon with metastasis to the liver. Weight loss, poor appetite, also suggestive of underlying cancer. S/p liver bx w/IR on 6/30. Per chart rvw, pain was most improved (though not optimal) on 7/2, where total daily MME 82.5. 7/3 total daily MME 50 - continues to have sharp, cramping, and aching pain, especially w/mild exertion.   -Pain regimen:   -Goal pain level 5/10   -Abdominal binder per patient request    -Oxycodone 10 >> 12.5mg q6h scheduled   -Dilaudid 0.3mg IV q2h PRN    -Tylenol 500mg q6h, avoid additional tylenol given liver disease   -Add-on robaxin 750mg q6h    - Avoid NSAIDs given GI bleed  -Discussed w/PharmD. Consider switching to oxycontin once daily MME determined. Start oxycontin at 50 - % of total daily MME, then add breakthrough oxycodone PRNs?  -Palliative consult, recs appreciated   -Liver bx showing colon cancer w/mets, genotype pending  -Add-on CEA for outpatient oncology follow-up  -Hold on CT chest per oncology curbside   -Referral for [facility] on discharge     R inguinal hernia  Enlarged, fluid palpated, but reducible in setting of colon cancer w/mets discussed above. Not noted on CT AP on admission.   -Continue to  monitor   -Outpatient surgical follow-up if remains benign  -Pain management per above    Microcytic anemia - resolved  MCV 50, Hg 7 on admission, s/p total 2u pRBC. Remains hemodynamically stable and hgb between 9-10. Mass in abdomen, may be primary colon cancer. Would be a candidate for colonoscopy.   - Ferritin  - Peripheral smear in process  - CBC if becomes hemodynamically unstable     Lactic Acidosis  Likely Type A lactic acidosis, improved with fluid resuscitation and will be getting RBC transfusions.   - No further laboratory testing required    Itching   - Hydroxyzine PRN  - Zyrtec PRN for itching          Diet: Regular Diet Adult  Snacks/Supplements Adult: Glucerna; With Meals  Snacks/Supplements Adult: Ensure Plus High Protein; With Meals    DVT Prophylaxis: VTE Prophylaxis contraindicated due to acute anemia   Her Catheter: Not present  Lines: None     Cardiac Monitoring: None  Code Status: Full Code      Clinically Significant Risk Factors               # Hypoalbuminemia: Lowest albumin = 2.8 g/dL at 6/29/2025 11:50 PM, will monitor as appropriate     # Hypertension: Noted on problem list                       Social Drivers of Health    Tobacco Use: High Risk (5/11/2021)    Received from [employer]    Patient History     Smoking Tobacco Use: Every Day     Smokeless Tobacco Use: Unknown   Interpersonal Safety: High Risk (7/1/2025)    Interpersonal Safety     Do you feel physically and emotionally safe where you currently live?: No     Within the past 12 months, have you been hit, slapped, kicked or otherwise physically hurt by someone?: No     Within the past 12 months, have you been humiliated or emotionally abused in other ways by your partner or ex-partner?: No          Disposition Plan   Medically Ready for Discharge: Anticipated Tomorrow           The patient's care was discussed with the Attending Physician, Dr. Lunsford.    Merlin Guevara MD  Hospitalist Service  [facility]  "Hospital  Securely message with [employer] (more info)  Text page via [facility] Paging/Directory   ______________________________________________________________________    Interval History   Continues to have abdominal pain - reports that he has all sorts of sensation in that area - cramping, sharp, and aching. Sometimes he has a sensation of \"bones\" in that area. Denies dyspnea, chest pain. No BM overnight and passing gas.     Isai understands that his pain will not go away completely, but would like for it to be tolerable enough for him to go on short walks and get to his appointments. He reports that he walked from his room to the nursing station yesterday and it was difficult d/t the pain.      Physical Exam   Vital Signs: Temp: 97.9  F (36.6  C) Temp src: Oral BP: 125/78 Pulse: 75   Resp: 16 SpO2: 97 % O2 Device: None (Room air)    Weight: 125 lbs 7 oz    Physical Exam  Constitutional:       General: He is not in acute distress.     Appearance: He is not toxic-appearing.   HENT:      Head: Normocephalic and atraumatic.   Pulmonary:      Effort: Pulmonary effort is normal.   Abdominal:      General: There is distension.      Tenderness: There is abdominal tenderness. There is guarding and rebound.   Genitourinary:     Comments: Enlarged, but reducible inguinal hernia R groin. Fluid filled.   Musculoskeletal:      Right lower leg: No edema.      Left lower leg: No edema.   Neurological:      General: No focal deficit present.      Mental Status: He is alert and oriented to person, place, and time.   Psychiatric:         Mood and Affect: Mood normal.         Behavior: Behavior normal.           Medical Decision Making       ------------------ MEDICAL DECISION MAKING ------------------------------------------------------------------------------------------------------      Data   ------------------------- PAST 24 HR DATA REVIEWED -----------------------------------------------  "

## 2025-07-04 NOTE — PLAN OF CARE
Problem: Adult Inpatient Plan of Care  Goal: Absence of Hospital-Acquired Illness or Injury  Outcome: Progressing  Intervention: Identify and Manage Fall Risk  Recent Flowsheet Documentation  Taken 7/3/2025 1826 by Yarely Guerrero RN  Safety Promotion/Fall Prevention:   patient and family education   nonskid shoes/slippers when out of bed   activity supervised   assistive device/personal items within reach   supervised activity   safety round/check completed  Intervention: Prevent Skin Injury  Recent Flowsheet Documentation  Taken 7/3/2025 1826 by Yarely Guerrero RN  Body Position:   turned   position changed independently     Problem: Delirium  Goal: Improved Behavioral Control  Intervention: Minimize Safety Risk  Recent Flowsheet Documentation  Taken 7/3/2025 1826 by Yarely Guerrero RN  Enhanced Safety Measures: pain management     Problem: Comorbidity Management  Goal: Maintenance of Behavioral Health Symptom Control  Intervention: Maintain Behavioral Health Symptom Control  Recent Flowsheet Documentation  Taken 7/3/2025 1826 by Yarely Guerrero RN  Medication Review/Management: medications reviewed   Goal Outcome Evaluation:    Blood pressure 137/83, pulse 75, temperature 97.9  F, resp. rate 16, SpO2 97% RA.    Scheduled oxycodone given, PRN dilaudid given twice for pain.    Appetite was poor, patient ate about 25% of dinner was eaten.     Atarax given for itchiness.

## 2025-07-04 NOTE — PLAN OF CARE
"Goal Outcome Evaluation:                  VSS.  Pain management.   Oxycodone scheduled and Dilaudid iv.  Pain rating #9 left sided abdomen. IV SL.  Tolerating diet.  Poor appetite. Voiding independently.  Care plan reviewed.  Problem: Adult Inpatient Plan of Care  Goal: Plan of Care Review  Description: The Plan of Care Review/Shift note should be completed every shift.  The Outcome Evaluation is a brief statement about your assessment that the patient is improving, declining, or no change.  This information will be displayed automatically on your shift  note.  7/4/2025 0522 by Trena Gomez RN  Outcome: Progressing  7/4/2025 0522 by Trena Gomez RN  Outcome: Progressing  Goal: Patient-Specific Goal (Individualized)  Description: You can add care plan individualizations to a care plan. Examples of Individualization might be:  \"Parent requests to be called daily at 9am for status\", \"I have a hard time hearing out of my right ear\", or \"Do not touch me to wake me up as it startles  me\".  7/4/2025 0522 by Trena Gomez RN  Outcome: Progressing  7/4/2025 0522 by Trena Gomez RN  Outcome: Progressing  Goal: Absence of Hospital-Acquired Illness or Injury  7/4/2025 0522 by Trena Gomez RN  Outcome: Progressing  7/4/2025 0522 by Trena Gomez RN  Outcome: Progressing  Intervention: Identify and Manage Fall Risk  Recent Flowsheet Documentation  Taken 7/4/2025 0000 by Trena Gomez RN  Safety Promotion/Fall Prevention: nonskid shoes/slippers when out of bed  Intervention: Prevent Skin Injury  Recent Flowsheet Documentation  Taken 7/4/2025 0000 by Trena Gomez RN  Body Position: position changed independently  Goal: Optimal Comfort and Wellbeing  7/4/2025 0522 by Trena Gomez RN  Outcome: Progressing  7/4/2025 0522 by Trena Gomez RN  Outcome: Progressing  Intervention: Monitor Pain and Promote Comfort  Recent Flowsheet " Documentation  Taken 7/4/2025 0324 by Trena Gomez RN  Pain Management Interventions: medication (see MAR)  Taken 7/3/2025 2339 by Trena Gomez RN  Pain Management Interventions: medication (see MAR)  Goal: Readiness for Transition of Care  7/4/2025 0522 by Trena Gomez RN  Outcome: Progressing  7/4/2025 0522 by Trena Gomez RN  Outcome: Progressing     Problem: Delirium  Goal: Optimal Coping  7/4/2025 0522 by Trena Gomez RN  Outcome: Progressing  7/4/2025 0522 by Trena Gomez RN  Outcome: Progressing  Goal: Improved Behavioral Control  7/4/2025 0522 by Trena Gomez RN  Outcome: Progressing  7/4/2025 0522 by Trena Gomez RN  Outcome: Progressing  Goal: Improved Attention and Thought Clarity  7/4/2025 0522 by Trena Gomez RN  Outcome: Progressing  7/4/2025 0522 by Trena Gomez RN  Outcome: Progressing  Goal: Improved Sleep  7/4/2025 0522 by Trena Gomez RN  Outcome: Progressing  7/4/2025 0522 by Trena Gomez RN  Outcome: Progressing     Problem: Anemia  Goal: Anemia Symptom Improvement  7/4/2025 0522 by Trena Gomez RN  Outcome: Progressing  7/4/2025 0522 by Trena Gomez RN  Outcome: Progressing  Intervention: Monitor and Manage Anemia  Recent Flowsheet Documentation  Taken 7/4/2025 0000 by Trena Gomez RN  Safety Promotion/Fall Prevention: nonskid shoes/slippers when out of bed     Problem: Comorbidity Management  Goal: Maintenance of Behavioral Health Symptom Control  7/4/2025 0522 by Trena Gomez RN  Outcome: Progressing  7/4/2025 0522 by Trena Gomez RN  Outcome: Progressing  Goal: Blood Pressure in Desired Range  7/4/2025 0522 by Trena Gomez RN  Outcome: Progressing  7/4/2025 0522 by Trena Gomez, RN  Outcome: Progressing     Problem: Pain Chronic (Persistent)  Goal: Optimal Pain Control and  Function  7/4/2025 0522 by Trena Gomez RN  Outcome: Progressing  7/4/2025 0522 by Trena Gomez RN  Outcome: Progressing  Intervention: Develop Pain Management Plan  Recent Flowsheet Documentation  Taken 7/4/2025 0324 by Trena Gomez, RN  Pain Management Interventions: medication (see MAR)  Taken 7/3/2025 2339 by Trena Gomez, RN  Pain Management Interventions: medication (see MAR)     Problem: Pain Acute  Goal: Optimal Pain Control and Function  7/4/2025 0522 by Trena Gomez, RN  Outcome: Progressing  7/4/2025 0522 by Trena Gomez RN  Outcome: Progressing  Intervention: Develop Pain Management Plan  Recent Flowsheet Documentation  Taken 7/4/2025 0324 by Trena Gomez, RN  Pain Management Interventions: medication (see MAR)  Taken 7/3/2025 2339 by Trena Gomez, RN  Pain Management Interventions: medication (see MAR)

## 2025-07-05 LAB — POTASSIUM SERPL-SCNC: 4.8 MMOL/L (ref 3.4–5.3)

## 2025-07-05 PROCEDURE — 250N000011 HC RX IP 250 OP 636

## 2025-07-05 PROCEDURE — 36415 COLL VENOUS BLD VENIPUNCTURE: CPT

## 2025-07-05 PROCEDURE — 250N000013 HC RX MED GY IP 250 OP 250 PS 637: Performed by: STUDENT IN AN ORGANIZED HEALTH CARE EDUCATION/TRAINING PROGRAM

## 2025-07-05 PROCEDURE — 250N000013 HC RX MED GY IP 250 OP 250 PS 637

## 2025-07-05 PROCEDURE — 99232 SBSQ HOSP IP/OBS MODERATE 35: CPT | Mod: GC

## 2025-07-05 PROCEDURE — 84132 ASSAY OF SERUM POTASSIUM: CPT

## 2025-07-05 PROCEDURE — 120N000001 HC R&B MED SURG/OB

## 2025-07-05 RX ORDER — METHOCARBAMOL 500 MG/1
1000 TABLET, FILM COATED ORAL EVERY 6 HOURS
Status: DISCONTINUED | OUTPATIENT
Start: 2025-07-05 | End: 2025-07-12 | Stop reason: HOSPADM

## 2025-07-05 RX ORDER — VENLAFAXINE HYDROCHLORIDE 37.5 MG/1
37.5 CAPSULE, EXTENDED RELEASE ORAL
Status: DISCONTINUED | OUTPATIENT
Start: 2025-07-05 | End: 2025-07-12 | Stop reason: HOSPADM

## 2025-07-05 RX ORDER — MAGNESIUM HYDROXIDE/ALUMINUM HYDROXICE/SIMETHICONE 120; 1200; 1200 MG/30ML; MG/30ML; MG/30ML
15 SUSPENSION ORAL ONCE
Status: COMPLETED | OUTPATIENT
Start: 2025-07-05 | End: 2025-07-05

## 2025-07-05 RX ORDER — LIDOCAINE 4 G/G
1 PATCH TOPICAL EVERY 24 HOURS
Status: DISCONTINUED | OUTPATIENT
Start: 2025-07-05 | End: 2025-07-07

## 2025-07-05 RX ORDER — HYDROMORPHONE HYDROCHLORIDE 1 MG/ML
0.3 INJECTION, SOLUTION INTRAMUSCULAR; INTRAVENOUS; SUBCUTANEOUS
Status: DISCONTINUED | OUTPATIENT
Start: 2025-07-05 | End: 2025-07-12 | Stop reason: HOSPADM

## 2025-07-05 RX ADMIN — POLYETHYLENE GLYCOL 3350 17 G: 17 POWDER, FOR SOLUTION ORAL at 08:45

## 2025-07-05 RX ADMIN — METHOCARBAMOL 1000 MG: 500 TABLET ORAL at 11:32

## 2025-07-05 RX ADMIN — OXYCODONE HYDROCHLORIDE 12.5 MG: 5 TABLET ORAL at 18:06

## 2025-07-05 RX ADMIN — HYDROMORPHONE HYDROCHLORIDE 0.3 MG: 1 INJECTION, SOLUTION INTRAMUSCULAR; INTRAVENOUS; SUBCUTANEOUS at 20:31

## 2025-07-05 RX ADMIN — OXYCODONE HYDROCHLORIDE 12.5 MG: 5 TABLET ORAL at 23:08

## 2025-07-05 RX ADMIN — METHOCARBAMOL 1000 MG: 500 TABLET ORAL at 23:08

## 2025-07-05 RX ADMIN — AMLODIPINE BESYLATE 5 MG: 5 TABLET ORAL at 20:34

## 2025-07-05 RX ADMIN — ACETAMINOPHEN 500 MG: 500 TABLET ORAL at 08:44

## 2025-07-05 RX ADMIN — METHOCARBAMOL 1000 MG: 500 TABLET ORAL at 18:08

## 2025-07-05 RX ADMIN — ALUMINUM HYDROXIDE, MAGNESIUM HYDROXIDE, AND SIMETHICONE 15 ML: 200; 200; 20 SUSPENSION ORAL at 11:31

## 2025-07-05 RX ADMIN — ACETAMINOPHEN 500 MG: 500 TABLET ORAL at 01:04

## 2025-07-05 RX ADMIN — OXYCODONE HYDROCHLORIDE 12.5 MG: 5 TABLET ORAL at 06:53

## 2025-07-05 RX ADMIN — METHOCARBAMOL 750 MG: 750 TABLET ORAL at 01:04

## 2025-07-05 RX ADMIN — LIDOCAINE 1 PATCH: 4 PATCH TOPICAL at 11:32

## 2025-07-05 RX ADMIN — HYDROMORPHONE HYDROCHLORIDE 0.3 MG: 1 INJECTION, SOLUTION INTRAMUSCULAR; INTRAVENOUS; SUBCUTANEOUS at 08:44

## 2025-07-05 RX ADMIN — POLYETHYLENE GLYCOL 3350 17 G: 17 POWDER, FOR SOLUTION ORAL at 20:34

## 2025-07-05 RX ADMIN — OXYCODONE HYDROCHLORIDE 12.5 MG: 5 TABLET ORAL at 11:32

## 2025-07-05 RX ADMIN — HYDROMORPHONE HYDROCHLORIDE 0.3 MG: 1 INJECTION, SOLUTION INTRAMUSCULAR; INTRAVENOUS; SUBCUTANEOUS at 15:49

## 2025-07-05 RX ADMIN — VENLAFAXINE HYDROCHLORIDE 37.5 MG: 37.5 CAPSULE, EXTENDED RELEASE ORAL at 11:32

## 2025-07-05 RX ADMIN — ENOXAPARIN SODIUM 40 MG: 40 INJECTION SUBCUTANEOUS at 08:45

## 2025-07-05 RX ADMIN — OXYCODONE HYDROCHLORIDE 12.5 MG: 5 TABLET ORAL at 14:08

## 2025-07-05 RX ADMIN — ACETAMINOPHEN 500 MG: 500 TABLET ORAL at 14:08

## 2025-07-05 RX ADMIN — METHOCARBAMOL 750 MG: 750 TABLET ORAL at 06:52

## 2025-07-05 RX ADMIN — OXYCODONE HYDROCHLORIDE 12.5 MG: 5 TABLET ORAL at 01:04

## 2025-07-05 RX ADMIN — AMLODIPINE BESYLATE 5 MG: 5 TABLET ORAL at 08:44

## 2025-07-05 RX ADMIN — ACETAMINOPHEN 500 MG: 500 TABLET ORAL at 18:07

## 2025-07-05 ASSESSMENT — ACTIVITIES OF DAILY LIVING (ADL)
ADLS_ACUITY_SCORE: 63

## 2025-07-05 NOTE — PROGRESS NOTES
[facility]    Medicine Progress Note - Hospitalist Service    Date of Admission:  6/29/2025    Assessment & Plan      Isai Leong is a 69 year old male admitted on 6/29/2025. He has PMH of depression, hypertension, headaches and is admitted for chronic right sided abdominal pain found to have a large mass in his colon with likely metastatic liver disease. Remains hospitalized for pain control.     Lobulated Mass of Ascending Colon, with Ileum and Lymph Node Involvement  Metastatic Liver Lesions  Presumed cancer pain  Chronic right abdominal pain with masses as above, CT imaging with very large liver and multiple lesions along with the ascending colon mass. Physical exam consistent with large nodular liver, likely primary cancer of the colon with metastasis to the liver. Weight loss, poor appetite, also suggestive of underlying cancer. S/p liver bx w/IR on 6/30. Abdominal pain continues to be 8/10, despite increase in oxycodone, with cramping and sharpness. Total daily MME 90 on 7/4.   -Pain regimen:   -Goal pain level 5/10   -Abdominal binder per patient request    -Oxycodone 12.5mg q6h >> q4h scheduled   -Dilaudid 0.3mg IV q2h >> q3h PRN    -Tylenol 500mg q6h, avoid additional tylenol given liver disease   -Robaxin 750mg >> 1000mg q6h    -Add-on venlafaxine 37.5mg d - do not exceed 150mg/d d/t hepatic impairment    -Avoid NSAIDs given GI bleed   -Considered gabapentin, but this is hepatically cleared. Limited dosages and effect.   -Discussed w/PharmD. Consider switching to oxycontin once daily MME determined. Start oxycontin at 50 - % of total daily MME, then add breakthrough oxycodone PRNs?  -Palliative consult, recs appreciated   -Liver bx showing colon cancer w/mets, genotype pending  -Add-on CEA for outpatient oncology follow-up  -Hold on CT chest per oncology curbside   -Referral for [facility] on discharge     R inguinal hernia  Enlarged, fluid palpated, but reducible  in setting of colon cancer w/mets discussed above. Not noted on CT AP on admission.   -Continue to monitor   -Outpatient surgical follow-up if remains benign  -Pain management per above    Microcytic anemia - resolved  MCV 50, Hg 7 on admission, s/p total 2u pRBC. Remains hemodynamically stable and hgb between 9-10. Ferritin 100. PBS w/slight target cells and sickle cells - unclear significance w/lack of personal/family hx of this per chart rvw.   - CBC if becomes hemodynamically unstable     Lactic Acidosis  Likely Type A lactic acidosis, improved with fluid resuscitation and will be getting RBC transfusions.   - No further laboratory testing required    Itching   - Hydroxyzine PRN  - Zyrtec PRN for itching    Housing concerns  Patient requested to speak w/SW to discuss housing. Remote hx of homelessness. He is currently living in an apartment and is concerned about losing this. Did not explicate why with me.   -CM/SW consult, recs appreciated           Diet: Regular Diet Adult  Snacks/Supplements Adult: Glucerna; With Meals  Snacks/Supplements Adult: Ensure Plus High Protein; With Meals    DVT Prophylaxis: VTE Prophylaxis contraindicated due to acute anemia   Her Catheter: Not present  Lines: None     Cardiac Monitoring: None  Code Status: Full Code      Clinically Significant Risk Factors               # Hypoalbuminemia: Lowest albumin = 2.8 g/dL at 6/29/2025 11:50 PM, will monitor as appropriate     # Hypertension: Noted on problem list                       Social Drivers of Health    Tobacco Use: High Risk (5/11/2021)    Received from [facility]    Patient History     Smoking Tobacco Use: Every Day     Smokeless Tobacco Use: Unknown   Interpersonal Safety: High Risk (7/1/2025)    Interpersonal Safety     Do you feel physically and emotionally safe where you currently live?: No     Within the past 12 months, have you been hit, slapped, kicked or otherwise physically hurt by someone?: No     Within the  past 12 months, have you been humiliated or emotionally abused in other ways by your partner or ex-partner?: No          Disposition Plan   Medically Ready for Discharge: Anticipated Tomorrow           The patient's care was discussed with the Attending Physician, Dr. Lunsford.    Merlin Guevara MD  Hospitalist Service  [facility]  Securely message with [employer] (more info)  Text page via [employer] Paging/Directory   ______________________________________________________________________    Interval History   NAEO. BM x1 overnight. Continues to report cramping, throbbing, sharpness in his abdomen. Worsens w/short walks. Pain medication strength is sufficient, but doesn't last and doesn't act quick enough per patient.     He reports some concerns about housing. He was homeless for 3y and just got a stable place; he doesn't want to lose this as he will be vulnerable w/his cancer and pain.     Physical Exam   Vital Signs: Temp: 98.5  F (36.9  C) Temp src: Oral BP: (!) 159/88 Pulse: 69   Resp: 18 SpO2: 98 % O2 Device: None (Room air)    Weight: 125 lbs 7 oz    Physical Exam  Constitutional:       General: He is not in acute distress.     Appearance: He is not toxic-appearing.   HENT:      Head: Normocephalic and atraumatic.   Pulmonary:      Effort: Pulmonary effort is normal.   Abdominal:      General: There is distension.      Tenderness: There is abdominal tenderness. There is guarding and rebound.   Genitourinary:     Comments: Enlarged, but reducible inguinal hernia R groin. Fluid filled.   Musculoskeletal:      Right lower leg: No edema.      Left lower leg: No edema.   Neurological:      General: No focal deficit present.      Mental Status: He is alert and oriented to person, place, and time.   Psychiatric:         Mood and Affect: Mood normal.         Behavior: Behavior normal.           Medical Decision Making       ------------------ MEDICAL DECISION MAKING  ------------------------------------------------------------------------------------------------------      Data   ------------------------- PAST 24 HR DATA REVIEWED -----------------------------------------------

## 2025-07-05 NOTE — PLAN OF CARE
Problem: Adult Inpatient Plan of Care  Goal: Absence of Hospital-Acquired Illness or Injury  Intervention: Identify and Manage Fall Risk  Recent Flowsheet Documentation  Taken 7/4/2025 0395 by Shavonne Colon, RN  Safety Promotion/Fall Prevention: safety round/check completed     Problem: Delirium  Goal: Optimal Coping  Outcome: Progressing   Pt has been alert and coherent.  Problem: Pain Chronic (Persistent)  Goal: Optimal Pain Control and Function  Outcome: Progressing  Pain managed with scheduled Robaxin, oxycodone and PRN dilaudid.  Problem: Electrolyte Imbalance  Goal: Electrolyte Balance  Outcome: Progressing   K level recheck in am per protocol.

## 2025-07-05 NOTE — PLAN OF CARE
Scheduled oxycodone changed from q6hr to q4hr.  Robaxin dose changed from 750mg to 1000mg.   Prn dilaudid changed from q2hr prn to q3hr prn.  New order for lidocaine patch to abdomen.  Maalox given x1 and it's also now ordered prn.  New order for effexor.  Activity encouraged.  K+ 4.8. No replacement needed. Re-check tomorrow am per protocol.

## 2025-07-05 NOTE — PLAN OF CARE
Problem: Adult Inpatient Plan of Care  Goal: Plan of Care Review  Description: The Plan of Care Review/Shift note should be completed every shift.  The Outcome Evaluation is a brief statement about your assessment that the patient is improving, declining, or no change.  This information will be displayed automatically on your shift  note.  Outcome: Progressing  Flowsheets (Taken 7/5/2025 5173)  Plan of Care Reviewed With: patient  Overall Patient Progress: improving   Goal Outcome Evaluation:      Plan of Care Reviewed With: patient    Overall Patient Progress: improvingOverall Patient Progress: improving             Assumed care for this patient last night at 2300. Patient managed with scheduled, oxycodone, robaxin and tylenol.  Potassium protocol.

## 2025-07-06 LAB
CREAT SERPL-MCNC: 0.72 MG/DL (ref 0.67–1.17)
EGFRCR SERPLBLD CKD-EPI 2021: >90 ML/MIN/1.73M2
MCV RBC AUTO: 57 FL (ref 78–100)
PLATELET # BLD AUTO: 407 10E3/UL (ref 150–450)
POTASSIUM SERPL-SCNC: 4.4 MMOL/L (ref 3.4–5.3)

## 2025-07-06 PROCEDURE — 250N000011 HC RX IP 250 OP 636: Mod: JW

## 2025-07-06 PROCEDURE — 84132 ASSAY OF SERUM POTASSIUM: CPT

## 2025-07-06 PROCEDURE — 120N000001 HC R&B MED SURG/OB

## 2025-07-06 PROCEDURE — 250N000013 HC RX MED GY IP 250 OP 250 PS 637

## 2025-07-06 PROCEDURE — 82565 ASSAY OF CREATININE: CPT

## 2025-07-06 PROCEDURE — 36415 COLL VENOUS BLD VENIPUNCTURE: CPT

## 2025-07-06 PROCEDURE — 250N000013 HC RX MED GY IP 250 OP 250 PS 637: Performed by: STUDENT IN AN ORGANIZED HEALTH CARE EDUCATION/TRAINING PROGRAM

## 2025-07-06 PROCEDURE — 85049 AUTOMATED PLATELET COUNT: CPT

## 2025-07-06 RX ADMIN — METHOCARBAMOL 1000 MG: 500 TABLET ORAL at 18:38

## 2025-07-06 RX ADMIN — ENOXAPARIN SODIUM 40 MG: 40 INJECTION SUBCUTANEOUS at 09:00

## 2025-07-06 RX ADMIN — AMLODIPINE BESYLATE 5 MG: 5 TABLET ORAL at 09:00

## 2025-07-06 RX ADMIN — OXYCODONE HYDROCHLORIDE 12.5 MG: 5 TABLET ORAL at 11:20

## 2025-07-06 RX ADMIN — METHOCARBAMOL 1000 MG: 500 TABLET ORAL at 11:20

## 2025-07-06 RX ADMIN — AMLODIPINE BESYLATE 5 MG: 5 TABLET ORAL at 20:08

## 2025-07-06 RX ADMIN — ACETAMINOPHEN 500 MG: 500 TABLET ORAL at 14:29

## 2025-07-06 RX ADMIN — VENLAFAXINE HYDROCHLORIDE 37.5 MG: 37.5 CAPSULE, EXTENDED RELEASE ORAL at 08:58

## 2025-07-06 RX ADMIN — HYDROMORPHONE HYDROCHLORIDE 0.3 MG: 1 INJECTION, SOLUTION INTRAMUSCULAR; INTRAVENOUS; SUBCUTANEOUS at 09:00

## 2025-07-06 RX ADMIN — OXYCODONE HYDROCHLORIDE 12.5 MG: 5 TABLET ORAL at 06:51

## 2025-07-06 RX ADMIN — METHOCARBAMOL 1000 MG: 500 TABLET ORAL at 06:52

## 2025-07-06 RX ADMIN — POLYETHYLENE GLYCOL 3350 17 G: 17 POWDER, FOR SOLUTION ORAL at 08:58

## 2025-07-06 RX ADMIN — ACETAMINOPHEN 500 MG: 500 TABLET ORAL at 00:27

## 2025-07-06 RX ADMIN — OXYCODONE HYDROCHLORIDE 12.5 MG: 5 TABLET ORAL at 14:29

## 2025-07-06 RX ADMIN — OXYCODONE HYDROCHLORIDE 12.5 MG: 5 TABLET ORAL at 23:14

## 2025-07-06 RX ADMIN — OXYCODONE HYDROCHLORIDE 12.5 MG: 5 TABLET ORAL at 02:35

## 2025-07-06 RX ADMIN — HYDROMORPHONE HYDROCHLORIDE 0.3 MG: 1 INJECTION, SOLUTION INTRAMUSCULAR; INTRAVENOUS; SUBCUTANEOUS at 00:37

## 2025-07-06 RX ADMIN — OXYCODONE HYDROCHLORIDE 12.5 MG: 5 TABLET ORAL at 18:38

## 2025-07-06 RX ADMIN — POLYETHYLENE GLYCOL 3350 17 G: 17 POWDER, FOR SOLUTION ORAL at 20:08

## 2025-07-06 RX ADMIN — METHOCARBAMOL 1000 MG: 500 TABLET ORAL at 23:14

## 2025-07-06 RX ADMIN — HYDROMORPHONE HYDROCHLORIDE 0.3 MG: 1 INJECTION, SOLUTION INTRAMUSCULAR; INTRAVENOUS; SUBCUTANEOUS at 15:57

## 2025-07-06 RX ADMIN — HYDROMORPHONE HYDROCHLORIDE 0.3 MG: 1 INJECTION, SOLUTION INTRAMUSCULAR; INTRAVENOUS; SUBCUTANEOUS at 20:08

## 2025-07-06 RX ADMIN — ACETAMINOPHEN 500 MG: 500 TABLET ORAL at 06:55

## 2025-07-06 RX ADMIN — ACETAMINOPHEN 500 MG: 500 TABLET ORAL at 18:38

## 2025-07-06 RX ADMIN — HYDROXYZINE HYDROCHLORIDE 10 MG: 10 TABLET, FILM COATED ORAL at 00:37

## 2025-07-06 RX ADMIN — LIDOCAINE 1 PATCH: 4 PATCH TOPICAL at 11:20

## 2025-07-06 ASSESSMENT — ACTIVITIES OF DAILY LIVING (ADL)
ADLS_ACUITY_SCORE: 63
ADLS_ACUITY_SCORE: 62
ADLS_ACUITY_SCORE: 63
ADLS_ACUITY_SCORE: 63
ADLS_ACUITY_SCORE: 62
ADLS_ACUITY_SCORE: 63
ADLS_ACUITY_SCORE: 62
ADLS_ACUITY_SCORE: 63
ADLS_ACUITY_SCORE: 63
ADLS_ACUITY_SCORE: 62
ADLS_ACUITY_SCORE: 63
ADLS_ACUITY_SCORE: 62
ADLS_ACUITY_SCORE: 63
ADLS_ACUITY_SCORE: 63
DEPENDENT_IADLS:: INDEPENDENT
ADLS_ACUITY_SCORE: 62
ADLS_ACUITY_SCORE: 63
ADLS_ACUITY_SCORE: 62
ADLS_ACUITY_SCORE: 63
ADLS_ACUITY_SCORE: 62

## 2025-07-06 NOTE — PROGRESS NOTES
[facility]    Medicine Progress Note - Hospitalist Service    Date of Admission:  6/29/2025    Assessment & Plan      Isai Leong is a 69 year old male admitted on 6/29/2025. He has PMH of depression, hypertension, headaches and is admitted for chronic right sided abdominal pain found to have a large mass in his colon with likely metastatic liver disease. Remains hospitalized for pain control.     Lobulated Mass of Ascending Colon, with Ileum and Lymph Node Involvement  Metastatic Liver Lesions  Presumed cancer pain  Chronic right abdominal pain with masses as above, CT imaging with very large liver and multiple lesions along with the ascending colon mass. Physical exam consistent with large nodular liver, likely primary cancer of the colon with metastasis to the liver. Weight loss, poor appetite, also suggestive of underlying cancer. S/p liver bx w/IR on 6/30. Abdominal pain continues to be 8/10, despite increase in oxycodone, with cramping and sharpness. Total daily  on 7/6 - mentation intact, no si/sx of respiratory distress. Awaiting palliative team for recs.    -Pain regimen:   -Goal pain level 5/10   -Abdominal binder per patient request    -Oxycodone 12.5mg q4h scheduled   -Dilaudid 0.3mg IV q3h PRN    -Tylenol 500mg q6h, avoid additional tylenol given liver disease   -Robaxin 1000mg q6h    -Venlafaxine 37.5mg d - do not exceed 150mg/d d/t hepatic impairment    -Avoid NSAIDs given GI bleed   -Considered gabapentin, but this is hepatically cleared. Limited dosages and effect.   -Discussed w/PharmD. Consider switching to oxycontin once daily MME determined. Start oxycontin at 50 - % of total daily MME, then add breakthrough oxycodone PRNs?  -Palliative consult, recs appreciated   -Liver bx showing colon cancer w/mets, genotype pending  -Add-on CEA for outpatient oncology follow-up  -Hold on CT chest per oncology curbside   -Referral for [facility] on discharge      R inguinal hernia  Enlarged, fluid palpated, but reducible in setting of colon cancer w/mets discussed above. Not noted on CT AP on admission.   -Continue to monitor   -Outpatient surgical follow-up if remains benign  -Pain management per above    Microcytic anemia - resolved  MCV 50, Hg 7 on admission, s/p total 2u pRBC. Remains hemodynamically stable and hgb between 9-10. Ferritin 100. PBS w/slight target cells and sickle cells - unclear significance w/lack of personal/family hx of this per chart rvw.   - CBC if becomes hemodynamically unstable     Lactic Acidosis  Likely Type A lactic acidosis, improved with fluid resuscitation and will be getting RBC transfusions.   - No further laboratory testing required    Itching   - Hydroxyzine PRN  - Zyrtec PRN for itching    Housing concerns  Patient requested to speak w/SW to discuss housing. Remote hx of homelessness. He is currently living in an apartment and is concerned about losing this. Did not explicate why with me.   -CM/SW consult, recs appreciated           Diet: Regular Diet Adult  Snacks/Supplements Adult: Glucerna; With Meals  Snacks/Supplements Adult: Ensure Plus High Protein; With Meals    DVT Prophylaxis: VTE Prophylaxis contraindicated due to acute anemia   Her Catheter: Not present  Lines: None     Cardiac Monitoring: None  Code Status: Full Code      Clinically Significant Risk Factors               # Hypoalbuminemia: Lowest albumin = 2.8 g/dL at 6/29/2025 11:50 PM, will monitor as appropriate     # Hypertension: Noted on problem list                # Financial/Environmental Concerns: unable to afford rent/mortgage;other (see comments) (Patient at risk for eviction a he is3 months behind in his rent)         Social Drivers of Health    Tobacco Use: High Risk (5/11/2021)    Received from [employer]    Patient History     Smoking Tobacco Use: Every Day     Smokeless Tobacco Use: Unknown   Interpersonal Safety: High Risk (7/1/2025)     Interpersonal Safety     Do you feel physically and emotionally safe where you currently live?: No     Within the past 12 months, have you been hit, slapped, kicked or otherwise physically hurt by someone?: No     Within the past 12 months, have you been humiliated or emotionally abused in other ways by your partner or ex-partner?: No          Disposition Plan   Medically Ready for Discharge: Anticipated in 2-4 Days           The patient's care was discussed with the Attending Physician, Dr. Lunsford.    Merlin Guevara MD  Hospitalist Service  [facility]  Securely message with [employer] (more info)  Text page via [facility] Paging/Directory   ______________________________________________________________________    Interval History   NAEO. BM x1 overnight. Reports improvement of pain on current regimen, but has difficulty ambulating and going to the bathroom. Denies dyspnea, chest pain. PO intake okay - forcing himself to eat and remain strong.     Physical Exam   Vital Signs: Temp: 98.5  F (36.9  C) Temp src: Oral BP: (!) 157/82 Pulse: 82   Resp: 18 SpO2: 99 % O2 Device: None (Room air)    Weight: 125 lbs 7 oz    Physical Exam  Constitutional:       General: He is not in acute distress.     Appearance: He is not toxic-appearing.   HENT:      Head: Normocephalic and atraumatic.   Pulmonary:      Effort: Pulmonary effort is normal.   Abdominal:      General: There is distension.      Tenderness: There is abdominal tenderness. There is guarding and rebound.   Genitourinary:     Comments: Enlarged, but reducible inguinal hernia R groin. Fluid filled.   Musculoskeletal:      Right lower leg: No edema.      Left lower leg: No edema.   Neurological:      General: No focal deficit present.      Mental Status: He is alert and oriented to person, place, and time.   Psychiatric:         Mood and Affect: Mood normal.         Behavior: Behavior normal.           Medical Decision Making       ------------------  MEDICAL DECISION MAKING ------------------------------------------------------------------------------------------------------      Data   ------------------------- PAST 24 HR DATA REVIEWED -----------------------------------------------

## 2025-07-06 NOTE — PLAN OF CARE
Goal Outcome Evaluation:      Plan of Care Reviewed With: patient    Overall Patient Progress: no changeOverall Patient Progress: no change    Outcome Evaluation: Patient anticipates discharge to home with outpatient follow up

## 2025-07-06 NOTE — PLAN OF CARE
Goal Outcome Evaluation:             VSS [place] for HTN.   Problem: Pain Acute  Goal: Optimal Pain Control and Function  Outcome: Not Progressing  Intervention: Develop Pain Management Plan  Recent Flowsheet Documentation  Taken 7/6/2025 0037 by Trena Gomez RN  Pain Management Interventions: medication (see MAR)   Pain #9 for generalized rt side abdominal pain. And hernia discomfort. Scheduled and prn meds given. Sleeping between medication  administration. Pt denies nausea.  Care pan reviewed.

## 2025-07-06 NOTE — PLAN OF CARE
Problem: Pain Chronic (Persistent)  Goal: Optimal Pain Control and Function  Outcome: Progressing  Intervention: Develop Pain Management Plan  Recent Flowsheet Documentation  Taken 7/5/2025 1549 by Roberto Zepeda RN  Pain Management Interventions: medication (see MAR)     Problem: Pain Acute  Goal: Optimal Pain Control and Function  Outcome: Progressing  Intervention: Develop Pain Management Plan  Recent Flowsheet Documentation  Taken 7/5/2025 1549 by Roberto Zepeda, RN  Pain Management Interventions: medication (see MAR)     Problem: Comorbidity Management  Goal: Maintenance of Behavioral Health Symptom Control  Outcome: Progressing  Goal: Blood Pressure in Desired Range  Outcome: Progressing   Goal Outcome Evaluation:       Pain was better controlled with new orders  pt rates pain 7-8/10.  Prn dilaudid was given x2 with good effect.     Pt had bm today and took mirlax this evening.   Pt ate about 50% of meal  Pt is drinking supplements and apple juice.      Pt is receptive to education and ask lots of quesitons to gain understanding.

## 2025-07-06 NOTE — PLAN OF CARE
Pt ambulated in hallways x1.  Appetite remains poor.  Pain continues to right lower abdomen. Pt is receiving scheduled and prn pain meds.   Emotional support offered.

## 2025-07-06 NOTE — CONSULTS
Care Management Initial Consult    General Information  Assessment completed with: Patient, VM-chart review,    Type of CM/SW Visit: Initial Assessment    Primary Care Provider verified and updated as needed: Yes (Per pt he has transferred all of his care over the [facility].  Patient is not able recall the name of the [facility] provider that he is working with.)   Readmission within the last 30 days:        Reason for Consult: community resources, discharge planning  Advance Care Planning: Advance Care Planning Reviewed: questions answered (Forms given to patient to review/complete - if he chooses)          Communication Assessment  Patient's communication style: spoken language (English or Bilingual)    Hearing Difficulty or Deaf: no   Wear Glasses or Blind: yes    Cognitive  Cognitive/Neuro/Behavioral: WDL                      Living Environment:   People in home: alone     Current living Arrangements: apartment (elevator access)      Able to return to prior arrangements: yes       Family/Social Support:  Care provided by: self  Provides care for: no one     Support system: Other (specify) (Granddaughter, mother and nephew)          Description of Support System: Supportive    Support Assessment: Adequate family and caregiver support    Current Resources:   Patient receiving home care services: No        Community Resources: Other (see comment)  Equipment currently used at home: none  Supplies currently used at home: None    Employment/Financial:  Employment Status: other (see comments) (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  No    Lifestyle & Psychosocial Needs:  Social Drivers of Health     Food Insecurity: Low Risk  (7/1/2025)     Food Insecurity     Within the past 12 months, did you worry that your food would run out before you got money to buy more?: No     Within the past 12 months, did the food you bought just not last and you didn t have money to get more?: No   Depression: Not on file   Housing Stability: Low Risk  (7/1/2025)    Housing Stability     Do you have housing? : Yes     Are you worried about losing your housing?: No   Tobacco Use: High Risk (5/11/2021)    Received from [facility]    Patient History     Smoking Tobacco Use: Every Day     Smokeless Tobacco Use: Unknown     Passive Exposure: Not on file   Financial Resource Strain: Low Risk  (7/1/2025)    Financial Resource Strain     Within the past 12 months, have you or your family members you live with been unable to get utilities (heat, electricity) when it was really needed?: No   Alcohol Use: Not on file   Transportation Needs: Low Risk  (7/1/2025)    Transportation Needs     Within the past 12 months, has lack of transportation kept you from medical appointments, getting your medicines, non-medical meetings or appointments, work, or from getting things that you need?: No   Physical Activity: Not on file   Interpersonal Safety: High Risk (7/1/2025)    Interpersonal Safety     Do you feel physically and emotionally safe where you currently live?: No     Within the past 12 months, have you been hit, slapped, kicked or otherwise physically hurt by someone?: No     Within the past 12 months, have you been humiliated or emotionally abused in other ways by your partner or ex-partner?: No   Stress: Not on file   Social Connections: Not on file   Health Literacy: Not on file       Functional Status:  Prior to admission patient needed assistance:   Dependent ADLs:: Independent  Dependent IADLs:: Independent       Mental Health Status:  Mental Health Status: Other (see comment)  Mental Health Management:  (Pt stressed d/t current health needs and financial  stressors)    Chemical Dependency Status:  Chemical Dependency Status: No Current Concerns             Values/Beliefs:  Spiritual, Cultural Beliefs, Muslim Practices, Values that affect care: no               Discussed  Partnership in Safe Discharge Planning  document with patient/family: No    Additional Information:  Pt admitted with for pain management.  Pt with a primary medical history significant for depression, HTN, HA, chronic right sided abdominal pain found to have large mass in colon, concern for metastatic liver disease.   Palliative care consult pending.   CMA d/t pt concerns with housing.    Met with pt.  Prior to hospitalization pt lived in alone in apartment.  Patient managed all his own care needs.  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  As a caretaker his rent was covered.  Pt lives on a limited income of [employer].  Pt has SNAP benefits of $290 per month.  Per pt he has not been able to pay his rent since May of this year.   Pt income does not cover the monthly rent.  Pt was on a H[employer] subside but let that go when he was working as a caretaker.  Per pt he is working a rep from [facility] a housing support agency through [facility].  Per pt Dom [phone] is the rep he has been communicating with but has not hear anything back on the agencies ability to assist him with rent.  Pt has not received an eviction notice however did receive a reminder letter that his rent was late and due.  Per pt he has [facility] financial worker [facility] [phone].  Per pt he has been homeless in the past and is concerned given his current health needs that he will not be able survive on the streets.  Agreed to call Dom ZAIDI regarding pt situation.  Per pt his granddaughter Shantal [phone] or his nephew Emeterio Pascal [phone] will be able to transport him home. Pt has not required any skilled home care or community based services as he has  been able to manage all his own care needs.    Pt aware that RNCC/SW remain available to assist with discharge planning.    Attempted to reach Dom [facility] housing assistance rep but no answer and no VM noted.   Will defer to weekday CM team for additional follow up.     Next Steps:   Follow up with [facility]  Follow for discharge planning    Marla Spangler, RN BSN, PHN, ACM-RN  Covering P2 Weekend RNCC available via [employer]

## 2025-07-07 LAB
ALBUMIN SERPL BCG-MCNC: 3 G/DL (ref 3.5–5.2)
ALP SERPL-CCNC: 328 U/L (ref 40–150)
ALT SERPL W P-5'-P-CCNC: 24 U/L (ref 0–70)
ANION GAP SERPL CALCULATED.3IONS-SCNC: 7 MMOL/L (ref 7–15)
AST SERPL W P-5'-P-CCNC: 110 U/L (ref 0–45)
ATRIAL RATE - MUSE: 85 BPM
BILIRUB SERPL-MCNC: 0.3 MG/DL
BUN SERPL-MCNC: 25.4 MG/DL (ref 8–23)
CALCIUM SERPL-MCNC: 9.3 MG/DL (ref 8.8–10.4)
CHLORIDE SERPL-SCNC: 97 MMOL/L (ref 98–107)
CREAT SERPL-MCNC: 0.77 MG/DL (ref 0.67–1.17)
DIASTOLIC BLOOD PRESSURE - MUSE: NORMAL MMHG
EGFRCR SERPLBLD CKD-EPI 2021: >90 ML/MIN/1.73M2
GLUCOSE SERPL-MCNC: 77 MG/DL (ref 70–99)
HCO3 SERPL-SCNC: 31 MMOL/L (ref 22–29)
HOLD SPECIMEN: NORMAL
INTERPRETATION ECG - MUSE: NORMAL
P AXIS - MUSE: 88 DEGREES
POTASSIUM SERPL-SCNC: 4.8 MMOL/L (ref 3.4–5.3)
POTASSIUM SERPL-SCNC: 4.8 MMOL/L (ref 3.4–5.3)
PR INTERVAL - MUSE: 80 MS
PROT SERPL-MCNC: 6.9 G/DL (ref 6.4–8.3)
QRS DURATION - MUSE: 148 MS
QT - MUSE: 398 MS
QTC - MUSE: 473 MS
R AXIS - MUSE: -9 DEGREES
SODIUM SERPL-SCNC: 135 MMOL/L (ref 135–145)
SYSTOLIC BLOOD PRESSURE - MUSE: NORMAL MMHG
T AXIS - MUSE: 234 DEGREES
VENTRICULAR RATE- MUSE: 85 BPM

## 2025-07-07 PROCEDURE — 99223 1ST HOSP IP/OBS HIGH 75: CPT | Performed by: NURSE PRACTITIONER

## 2025-07-07 PROCEDURE — 84132 ASSAY OF SERUM POTASSIUM: CPT

## 2025-07-07 PROCEDURE — 120N000001 HC R&B MED SURG/OB

## 2025-07-07 PROCEDURE — 250N000011 HC RX IP 250 OP 636

## 2025-07-07 PROCEDURE — 250N000013 HC RX MED GY IP 250 OP 250 PS 637

## 2025-07-07 PROCEDURE — 99418 PROLNG IP/OBS E/M EA 15 MIN: CPT | Performed by: NURSE PRACTITIONER

## 2025-07-07 PROCEDURE — 36415 COLL VENOUS BLD VENIPUNCTURE: CPT

## 2025-07-07 PROCEDURE — 250N000013 HC RX MED GY IP 250 OP 250 PS 637: Performed by: NURSE PRACTITIONER

## 2025-07-07 PROCEDURE — 99232 SBSQ HOSP IP/OBS MODERATE 35: CPT | Mod: GC

## 2025-07-07 PROCEDURE — 93010 ELECTROCARDIOGRAM REPORT: CPT | Mod: HIP | Performed by: INTERNAL MEDICINE

## 2025-07-07 PROCEDURE — 84155 ASSAY OF PROTEIN SERUM: CPT

## 2025-07-07 PROCEDURE — 250N000012 HC RX MED GY IP 250 OP 636 PS 637: Performed by: NURSE PRACTITIONER

## 2025-07-07 PROCEDURE — 93005 ELECTROCARDIOGRAM TRACING: CPT

## 2025-07-07 RX ORDER — HYDROXYZINE HYDROCHLORIDE 10 MG/1
10 TABLET, FILM COATED ORAL 3 TIMES DAILY PRN
Status: DISCONTINUED | OUTPATIENT
Start: 2025-07-07 | End: 2025-07-12 | Stop reason: HOSPADM

## 2025-07-07 RX ORDER — GABAPENTIN 100 MG/1
100 CAPSULE ORAL 3 TIMES DAILY
Status: DISCONTINUED | OUTPATIENT
Start: 2025-07-07 | End: 2025-07-12 | Stop reason: HOSPADM

## 2025-07-07 RX ORDER — LIDOCAINE 4 G/G
2 PATCH TOPICAL EVERY 24 HOURS
Status: DISCONTINUED | OUTPATIENT
Start: 2025-07-07 | End: 2025-07-12 | Stop reason: HOSPADM

## 2025-07-07 RX ORDER — DEXAMETHASONE 2 MG/1
2 TABLET ORAL EVERY 6 HOURS SCHEDULED
Status: DISCONTINUED | OUTPATIENT
Start: 2025-07-07 | End: 2025-07-12 | Stop reason: HOSPADM

## 2025-07-07 RX ADMIN — HYDROMORPHONE HYDROCHLORIDE 0.3 MG: 1 INJECTION, SOLUTION INTRAMUSCULAR; INTRAVENOUS; SUBCUTANEOUS at 21:38

## 2025-07-07 RX ADMIN — DEXAMETHASONE 2 MG: 2 TABLET ORAL at 18:43

## 2025-07-07 RX ADMIN — VENLAFAXINE HYDROCHLORIDE 37.5 MG: 37.5 CAPSULE, EXTENDED RELEASE ORAL at 09:00

## 2025-07-07 RX ADMIN — LIDOCAINE 2 PATCH: 4 PATCH TOPICAL at 10:22

## 2025-07-07 RX ADMIN — OXYCODONE HYDROCHLORIDE 2.5 MG: 5 TABLET ORAL at 22:40

## 2025-07-07 RX ADMIN — ACETAMINOPHEN 500 MG: 500 TABLET ORAL at 14:08

## 2025-07-07 RX ADMIN — POLYETHYLENE GLYCOL 3350 17 G: 17 POWDER, FOR SOLUTION ORAL at 21:44

## 2025-07-07 RX ADMIN — ENOXAPARIN SODIUM 40 MG: 40 INJECTION SUBCUTANEOUS at 08:36

## 2025-07-07 RX ADMIN — GABAPENTIN 100 MG: 100 CAPSULE ORAL at 21:41

## 2025-07-07 RX ADMIN — AMLODIPINE BESYLATE 5 MG: 5 TABLET ORAL at 21:40

## 2025-07-07 RX ADMIN — GABAPENTIN 100 MG: 100 CAPSULE ORAL at 12:03

## 2025-07-07 RX ADMIN — OXYCODONE HYDROCHLORIDE 12.5 MG: 5 TABLET ORAL at 14:09

## 2025-07-07 RX ADMIN — DEXAMETHASONE 2 MG: 2 TABLET ORAL at 12:03

## 2025-07-07 RX ADMIN — OXYCODONE HYDROCHLORIDE 12.5 MG: 5 TABLET ORAL at 03:09

## 2025-07-07 RX ADMIN — OXYCODONE HYDROCHLORIDE 12.5 MG: 5 TABLET ORAL at 19:14

## 2025-07-07 RX ADMIN — OXYCODONE HYDROCHLORIDE 12.5 MG: 5 TABLET ORAL at 10:23

## 2025-07-07 RX ADMIN — OXYCODONE HYDROCHLORIDE 12.5 MG: 5 TABLET ORAL at 06:30

## 2025-07-07 RX ADMIN — AMLODIPINE BESYLATE 5 MG: 5 TABLET ORAL at 08:35

## 2025-07-07 RX ADMIN — ACETAMINOPHEN 500 MG: 500 TABLET ORAL at 06:31

## 2025-07-07 RX ADMIN — ACETAMINOPHEN 500 MG: 500 TABLET ORAL at 18:42

## 2025-07-07 RX ADMIN — HYDROMORPHONE HYDROCHLORIDE 0.3 MG: 1 INJECTION, SOLUTION INTRAMUSCULAR; INTRAVENOUS; SUBCUTANEOUS at 08:33

## 2025-07-07 RX ADMIN — GABAPENTIN 100 MG: 100 CAPSULE ORAL at 14:09

## 2025-07-07 RX ADMIN — METHOCARBAMOL 1000 MG: 500 TABLET ORAL at 11:44

## 2025-07-07 RX ADMIN — METHOCARBAMOL 1000 MG: 500 TABLET ORAL at 06:29

## 2025-07-07 RX ADMIN — ACETAMINOPHEN 500 MG: 500 TABLET ORAL at 01:00

## 2025-07-07 RX ADMIN — METHOCARBAMOL 1000 MG: 500 TABLET ORAL at 18:43

## 2025-07-07 ASSESSMENT — ACTIVITIES OF DAILY LIVING (ADL)
ADLS_ACUITY_SCORE: 62
ADLS_ACUITY_SCORE: 67
ADLS_ACUITY_SCORE: 62
ADLS_ACUITY_SCORE: 67
ADLS_ACUITY_SCORE: 62
ADLS_ACUITY_SCORE: 67
ADLS_ACUITY_SCORE: 62
ADLS_ACUITY_SCORE: 67
ADLS_ACUITY_SCORE: 62

## 2025-07-07 NOTE — PLAN OF CARE
Goal Outcome Evaluation:               Monitoring for HTN b/p 149/88. Afebrile.  Abdominal pain #9-10.  Sscheduled medications bring pain down to an #8 at best.  Inguinal rt hernia.  Pt pleasant and able to make needs known.  Tolerating po intake.  Requesting lidocaine patches be increased to 2.  Care plan reviewed.      Problem: Pain Acute  Goal: Optimal Pain Control and Function  Outcome: Not Progressing

## 2025-07-07 NOTE — PLAN OF CARE
Problem: Adult Inpatient Plan of Care  Goal: Plan of Care Review  Description: The Plan of Care Review/Shift note should be completed every shift.  The Outcome Evaluation is a brief statement about your assessment that the patient is improving, declining, or no change.  This information will be displayed automatically on your shift  note.  Outcome: Progressing   Goal Outcome Evaluation:  Pt reports abdominal pain is a eight, lidocaine patches increased to 2 per pt request. Gabapentin started, pt had some mild relief. Tolerating a regular diet, family in visiting.

## 2025-07-07 NOTE — CONSULTS
Palliative Care Consultation Note  [facility]      Patient: Isai Leong  Date of Admission:  6/29/2025    Requesting Clinician / Team: Hospital Medicine  Reason for consult: Symptom management  Patient and family support       Recommendations & Counseling     GOALS OF CARE:   Life-prolonging without limits   Palliative care was not consulted to address goals of care at this time.    ADVANCE CARE PLANNING:  No health care directive on file. Per system policy, Surrogate Decision-makers for Patients With Diminished Decision-making Capacity offers guidance on possible decision-makers. In absence of HCD, next of kin will serve as surrogate decision maker.  Spouse Luiz Leong is next of kin.  There is no POLST form on file, defer to patient and/or next of kin for decisions   Code status: Full Code    MEDICAL MANAGEMENT:   #Pain,acute on chronic.  Cancer mediated abdominal pain secondary to large liver mass and ascending colon mass with  multiple lesions.  24 hour OME calculation: 75 mg oxycodone PO, 0.9 mg hydromorphone IV = 130.5 mg OME  Patient reports minimal effect of oxycodone, states hydromorphone works immediately, but does not last long enough to avoid high pain levels.   LFTs and 12 lead EKG today to evaluate degree of hepatic impairment and for QTc evaluation  Opioids: oxycodone 15 mg q 4 hours scheduled, hydromorphone 0.3 mg q 3 hours prn breakthrough pain.    Following LFT and EKG results, consider transition to Methadone.  Given past 24 hour OME, will recommend starting at 5 mg every 8 hours scheduled with prn oxycodone and hydromorphone for breakthrough pain. Continued surveillance for medication intolerance due to liver dysfunction warranted.  Acetaminophen (Tylenol), Scheduled  NSAIDs:  Avoid due to GIB  Muscle Relaxers:Methocarbamol (Robaxin) per primary team 1000 mg q 6 hours  Anti-depressants:  Venlafaxine (Effexor) per primary team. 37.5 mg daily, daily limit of 150 mg  due to hepatic impairment  Anti-convulsants:  Gabapentin (Neurontin) 100 mg TID, do not exceed 300 mg daily due to hepatic impairment  Other medicines for pain: Decadron 2 mg q 6 hours, hydroxyzine 10 mg TID PRN  Topicals: lidocaine patch to abdomen q 24 hours.  12 hour period with patch followed by 12 hour period without patch to avoid systemic toxicity  Heat, Ice, and Meditation  Bowel Regimen: Miralax 1 packet daily, Senna-Docusate 1-2 tablet two times daily prn - consider scheduling Senna-docusate if patient is not at baseline bowel frequency     #Nausea, disease processes - malignancy  -Pharmacologic management   Ondansetron (Zofran)   Prochlorperazine (COMPAZINE)   May see benefit from Decadron initiation  -Nonpharmacologic management  Small, frequent intake  Assess and avoid aggravating factors  Accupressure (C-band)  Aromatherapy, alcohol swabs known to be effective    PSYCHOSOCIAL/SPIRITUAL SUPPORT:  Family : spouse, extended family  [place]ia community: No Gnosticist     Palliative Care will continue to follow. Thank you for the consult and allowing us to aid in the care of Isai Leong.    These recommendations have been discussed with patient, primary team, nursing staff.    GIO North CNP  [facility], Palliative Care  Securely message with the [employer] Web Console (learn more here) or  Text page via [facility] Paging/Directory         Assessment      Isai Leong is a 69 year old male with a past medical history of depression, hypertension, headaches and chronic right sided abdominal pain who presented on 6/29 with abdominal pain and palpable abdominal mass.  Patient also reported ongoing nausea, poor appetite and 35 pound weight loss in the past 3 months.     Patient proceeded to have work up including CT imaging which revealed very large liver mass and multiple lesions along ascending colon with additional mass.  He underwent biopsy on 6/30 and results indicated primary colon cancer with liver  "mets.    Patient has continued to have significant abdominal pain which has inhibited his ability to mobilize out of bed or use the bathroom.        Today, the patient was seen for:  Symptom management  Patient support    History of Present Illness   Met with Isai at the bedside, accompanied by Niece who patient consented to be present during conversation.   I introduced our role as an extra layer of support and how we help patients and families dealing with serious, potentially life-limiting illnesses. I explained the composition of the palliative care team.  Palliative care helps patients and families navigate their care while focusing on the whole person; providing emotional, social and spiritual support  Palliative care often assists with symptom management, information sharing about what to expect from the illness, available treatment options and what effect those options may have on the disease course, and provide effective communication and caring support.    Isai reviewed his hospital presentation and stated that [facility], but that he could not wait for OP appointments to have adequate symptom management.  He stated that he appreciates straight forward information and stated that he was glad that his initial providers have informed him of his cancer diagnosis.  He acknowledged that he previously though of cancer as \"a little thing that can be taken off and it goes away\".      Briefly discussed metastatic disease implications, that it may not \"go away\" but that it can be managed with treatments managed in the oncology clinic.  Deferred to his oncology provider for a comprehensive review of treatment options and outlook of his disease.       Isai verbalized understanding of that explanation and discussed wanting to know more information during that visit to hear his options.  Stated to his niece, \"I'm not ready to leave y'all just yet\".      Reviewed symptom management as primary " purpose of palliative care consult and reviewed recommendations as above.  Discussed clinic follow up with palliative care for ongoing management.    Isai denied further questions or concerns at the conclusion of the visit.    Prognosis, Goals, & Planning:   Functional Status just prior to this current hospitalization:  ECOG1 (Restricted in physically strenuous activity but ambulatory and able to carry out work of a light or sedentary nature)    Prognosis, Goals, and/or Advance Care Planning:  We discussed general treatment options (full/restorative, selective/conservatives, and comfort only/hospice). We then discussed how these specifically apply to Isai.  Based on this discussion, Isai has decided to continue with full life-prolonging and restorative efforts  Discussed what continuing restorative/life-prolonging care entails, including continued (re)admissions to the hospital, continuing with preventative and primary care, seeing disease/organ specific specialty consultations for medical treatments in hopes to prolong life for as long as possible.      Code Status was addressed today:   No : established FULL code    Patient's decision making preferences: independently        Patient has decision-making capacity today for complex decisions: Intact           Coping, Meaning, & Spirituality:   Mood, coping, and/or meaning in the context of serious illness were addressed today: Yes  Surprised by new diagnosis, but maintains positive outlook and the desire to gain more information regarding next steps and treatment options.    Social:   Living situation: lives with significant other/spouse  Important relationships/caregivers: Spouse, extended family  Occupation: retired     Medications:  Reviewed this patient's medication profile and medications from this hospitalization.  [place] Board of Pharmacy Data Base Reviewed: Yes:   reviewed - no record of controlled substances  prescribed..    ROS:  Comprehensive ROS is reviewed and is negative except as here & per HPI:     Physical Exam   Vital Signs with Ranges  Temp:  [97.4  F (36.3  C)-98.5  F (36.9  C)] 97.4  F (36.3  C)  Pulse:  [82-90] 83  Resp:  [18-20] 20  BP: (149-177)/(82-90) 154/88  SpO2:  [95 %-99 %] 95 %  Wt Readings from Last 10 Encounters:   07/03/25 56.9 kg (125 lb 7 oz)   09/28/16 69.4 kg (153 lb)   01/25/16 69.6 kg (153 lb 6.4 oz)   08/19/15 68 kg (150 lb)   04/24/15 68.9 kg (152 lb)   04/03/15 66.2 kg (146 lb)   03/18/15 68.2 kg (150 lb 6.4 oz)     125 lbs 7 oz    PHYSICAL EXAM:  Constitutional: alert, no distress, and underweight   Cardiovascular: RRR  Respiratory: without increased work of breathing  Psychiatric: mentation appears normal, affect normal/bright, and judgment and insight intact  Abdomen: distended and tender to palpation  NEURO: No focal deficit.     Data reviewed:  Recent Results (from the past 24 hours)   Potassium   Result Value Ref Range    Potassium 4.8 3.4 - 5.3 mmol/L   Extra Purple Top EDTA (LAB USE ONLY)   Result Value Ref Range    Hold Specimen [facility]        Medical Decision Making       Please see A&P for additional details of medical decision making.  MANAGEMENT DISCUSSED with the following over the past 24 hours: primary team, nursing staff   NOTE(S)/MEDICAL RECORDS REVIEWED over the past 24 hours: H&P, progress notes, nursing notes and assessment flowsheets  Tests ORDERED & REVIEWED in the past 24 hours:  - CMP  - 12 lead EKG  SUPPLEMENTAL HISTORY, in addition to the patient's history, over the past 24 hours obtained from:   - Niece  Medical complexity over the past 24 hours:  - Intensive monitoring for MEDICATION TOXICITY  90 MINUTES SPENT BY ME on the date of service doing chart review, history, exam, documentation & further activities per the note.

## 2025-07-07 NOTE — PROGRESS NOTES
"CLINICAL NUTRITION SERVICES - ASSESSMENT NOTE    RECOMMENDATIONS FOR MDs/PROVIDERS TO ORDER:    Registered Dietitian Interventions:  Change supplement plan to Ensure+HiPro TID at snack periods: offering pt 1050 kcal and 60 g protein /d  D/w pt nutrition recs including small-frequent meals/snacks    Future/Additional Recommendations:     REASON FOR ASSESSMENT  LOS 7    69 year old male admitted on 6/29/2025. He has PMH of depression, hypertension, headaches and is admitted for chronic right sided abdominal pain found to have a large mass in his colon with likely metastatic liver disease. Remains hospitalized for pain control - palliative following.    INFORMATION OBTAINED  Pt reports abd pain has been consistent, is indeed effected by PO intake but remains comparable.   Weighed 165-167 lb for a long time, and wt reduced from this norm over only the last 3-6 months.  Drivers of wt loss are reduced appetite and early satiety.  Likes Ensure shakes, reports has been taking all of them (scheduled ensure/glucerna TID last week) was asking if 2 with breakfast would be OK: agreeable to RD suggestions of changing schedule to Ensure Plus TID from the prior/below.  Box of pizza from [employer] at bedside.    CURRENT NUTRITION ORDERS  Diet: Regular  Snacks/Supplements: Ensure+HiPro @BRK, Glucerna-Sb @AFIA+SUP    CURRENT INTAKE/TOLERANCE  2-3 meals per day  Meals 500-800 kcal, 20-30 g protein each by rough estimate  *Pt could attain adequate intake if maintaining this and taking Ens+HP TID    LABS  Nutrition-relevant labs: Reviewed    MEDICATIONS  Nutrition-relevant medications: Reviewed    ANTHROPOMETRICS  Height: 177.8 cm (5' 10\")  Admission Weight: 61.2 kg (135 lb) (06/30/25 0622)   Most Recent Weight: 56.9 kg (125 lb 7 oz) (07/03/25 1307)  IBW: 73 kg  BMI: Body mass index is 18 kg/m .     Weight History:   07/03/25 : 56.9 kg (125 lb 7 oz)  01/03/25 : (~158 lb per pt report of loss 35 lb 3-6 months) -21.6% w/i 3or6 months " (SIG loss even if over 1y)  ...  01/25/16 : 69.6 kg (153 lb 6.4 oz)  ...  03/18/15 : 68.2 kg (150 lb 6.4 oz)    Dosing Weight: 73 kg, based on ideal wt    ASSESSED NUTRITION NEEDS  Estimated Energy Needs: 2266 kcals/day ([place] Jeor*1.5)  Justification: Increased needs and Repletion  Estimated Protein Needs: 110 grams protein/day (kg*1.5)  Justification: Increased needs and Repletion  Estimated Fluid Needs: ~2300 mL/day (1 mL/kcal)  Justification: Maintenance    SYSTEM AND PHYSICAL FINDINGS    GI symptoms: persistent abd discomfort, LBS 7/5  Skin/wounds: Reviewed    MALNUTRITION  % Intake: </=75% for >/= 1 month (severe)  % Weight Loss: > 10% in 6 months (severe)   Subcutaneous Fat Loss: Orbital: Severe (further physical assessment not necessary)  Muscle Loss: Temples (temporalis muscle): Severe  Fluid Accumulation/Edema: None noted  Malnutrition Diagnosis: Severe malnutrition in the context of chronic illness  Malnutrition Present on Admission: Yes    NUTRITION DIAGNOSIS  Severe malnutrition in the context of chronic illness    INTERVENTIONS  Change supplement plan to Ensure+HiPro TID at snack periods: offering pt 1050 kcal and 60 g protein /d  D/w pt nutrition recs including small-frequent meals/snacks    GOALS  Patient to consume % of nutritionally adequate meal trays TID, or the equivalent with supplements/snacks.     MONITORING/EVALUATION  Progress toward goals will be monitored and evaluated per policy.

## 2025-07-07 NOTE — PROGRESS NOTES
[facility]    Medicine Progress Note - Hospitalist Service    Date of Admission:  6/29/2025    Assessment & Plan      Isai Leong is a 69 year old male admitted on 6/29/2025. He has PMH of depression, hypertension, headaches and is admitted for chronic right sided abdominal pain found to have a large mass in his colon with likely metastatic liver disease. Remains hospitalized for pain control - palliative following.     Lobulated Mass of Ascending Colon, with Ileum and Lymph Node Involvement  Metastatic Liver Lesions  Presumed cancer pain  Chronic right abdominal pain with masses as above, CT imaging with very large liver and multiple lesions along with the ascending colon mass. Physical exam consistent with large nodular liver, likely primary cancer of the colon with metastasis to the liver. Weight loss, poor appetite, also suggestive of underlying cancer. S/p liver bx w/IR on 6/30. Abdominal pain continues to be 8/10, despite increase in oxycodone, with cramping and sharpness. Total daily  on 7/6 - mentation intact, no si/sx of respiratory distress. Awaiting palliative team for recs.    -Palliative consult, recs appreciated     -Start gabapentin 100mg TID   - Decadron 2mg q6h    - Repeat CMP   - EKG   - Consider starting methadone tomorrow based on CMP  -Pain regimen:   -Goal pain level 5/10   -Abdominal binder per patient request    -Oxycodone 12.5mg q4h scheduled   -Dilaudid 0.3mg IV q3h PRN    -Tylenol 500mg q6h, avoid additional tylenol given liver disease   -Robaxin 1000mg q6h    -Venlafaxine 37.5mg d - do not exceed 150mg/d d/t hepatic impairment    -Avoid NSAIDs given GI bleed   -Considered gabapentin, but this is hepatically cleared. Limited dosages and effect.  -Liver bx showing colon cancer w/mets, genotype pending  -Add-on CEA for outpatient oncology follow-up  -Hold on CT chest per oncology curbside   -Referral for [facility] on discharge     R inguinal  hernia  Enlarged, fluid palpated, but reducible in setting of colon cancer w/mets discussed above. Not noted on CT AP on admission.   -Continue to monitor   -Outpatient surgical follow-up if remains benign  -Pain management per above    Microcytic anemia - resolved  MCV 50, Hg 7 on admission, s/p total 2u pRBC. Remains hemodynamically stable and hgb between 9-10. Ferritin 100. PBS w/slight target cells and sickle cells - unclear significance w/lack of personal/family hx of this per chart rvw.   - CBC if becomes hemodynamically unstable     Lactic Acidosis  Likely Type A lactic acidosis, improved with fluid resuscitation and will be getting RBC transfusions.   - No further laboratory testing required    Itching   - Hydroxyzine PRN  - Zyrtec PRN for itching    Housing concerns  Patient requested to speak w/SW to discuss housing. Remote hx of homelessness. He is currently living in an apartment and is concerned about losing this. Did not explicate why with me.   -CM/SW consult, recs appreciated           Diet: Regular Diet Adult  Snacks/Supplements Adult: Glucerna; With Meals  Snacks/Supplements Adult: Ensure Plus High Protein; With Meals    DVT Prophylaxis: VTE Prophylaxis contraindicated due to acute anemia   Her Catheter: Not present  Lines: None     Cardiac Monitoring: None  Code Status: Full Code      Clinically Significant Risk Factors               # Hypoalbuminemia: Lowest albumin = 2.8 g/dL at 6/29/2025 11:50 PM, will monitor as appropriate     # Hypertension: Noted on problem list                # Financial/Environmental Concerns: unable to afford rent/mortgage;other (see comments) (Patient at risk for eviction a he is3 months behind in his rent)         Social Drivers of Health    Tobacco Use: High Risk (5/11/2021)    Received from [facility]    Patient History     Smoking Tobacco Use: Every Day     Smokeless Tobacco Use: Unknown   Interpersonal Safety: High Risk (7/1/2025)    Interpersonal Safety      "Do you feel physically and emotionally safe where you currently live?: No     Within the past 12 months, have you been hit, slapped, kicked or otherwise physically hurt by someone?: No     Within the past 12 months, have you been humiliated or emotionally abused in other ways by your partner or ex-partner?: No          Disposition Plan   Medically Ready for Discharge: Anticipated in 2-4 Days           The patient's care was discussed with the Attending Physician, Dr. Lunsford.    Merlin Guevara MD  Hospitalist Service  [facility]  Securely message with [employer] (more info)  Text page via [facility] Paging/Directory   ______________________________________________________________________    Interval History   NAEO. Eating breakfast. Denies dyspnea, HA/lightheadedness, fevers/chills. Pain is \"okay\", but worsens w/movement. Walk to nursing station and getting to bathroom elicits pain.     Physical Exam   Vital Signs: Temp: 97.4  F (36.3  C) Temp src: Oral BP: (!) 154/88 Pulse: 83   Resp: 20 SpO2: 95 % O2 Device: None (Room air)    Weight: 125 lbs 7 oz    Physical Exam  Constitutional:       General: He is not in acute distress.     Appearance: He is not toxic-appearing.   HENT:      Head: Normocephalic and atraumatic.   Pulmonary:      Effort: Pulmonary effort is normal.   Abdominal:      General: There is distension.      Tenderness: There is abdominal tenderness. There is guarding and rebound.   Genitourinary:     Comments: Enlarged, but reducible inguinal hernia R groin. Fluid filled.   Musculoskeletal:      Right lower leg: No edema.      Left lower leg: No edema.   Neurological:      General: No focal deficit present.      Mental Status: He is alert and oriented to person, place, and time.   Psychiatric:         Mood and Affect: Mood normal.         Behavior: Behavior normal.           Medical Decision Making       ------------------ MEDICAL DECISION MAKING " ------------------------------------------------------------------------------------------------------      Data   ------------------------- PAST 24 HR DATA REVIEWED -----------------------------------------------

## 2025-07-07 NOTE — PLAN OF CARE
"[facility] - P2    RN Progress Note:            Pertinent Assessments:      Please refer to flowsheet rows for full assessment     A&Ox4. Continues to have 7-9/10 pain in the abdomen. Heat applied and the patient cycled between oral Oxycodone and IV Dilaudid PRN's. Up for a walk in the [place] independently.            Key Events - This Shift:     -none     RN Managed Protocols Ordered:  Yes  Protocols:Potassium  PRN'S: Oxycodone and Dilaudid  Protocols Status: Endorsed to Oncoming RN, recheck in AM                Barriers to Discharge:     -Pain     Goal Outcome Evaluation:      Plan of Care Reviewed With: patient    Overall Patient Progress: no changeOverall Patient Progress: no change    Outcome Evaluation: Continues to have 7-9/10 pain      Problem: Adult Inpatient Plan of Care  Goal: Plan of Care Review  Description: The Plan of Care Review/Shift note should be completed every shift.  The Outcome Evaluation is a brief statement about your assessment that the patient is improving, declining, or no change.  This information will be displayed automatically on your shift  note.  Outcome: Progressing  Flowsheets (Taken 7/6/2025 2053)  Outcome Evaluation: Continues to have 7-9/10 pain  Plan of Care Reviewed With: patient  Overall Patient Progress: no change  Goal: Patient-Specific Goal (Individualized)  Description: You can add care plan individualizations to a care plan. Examples of Individualization might be:  \"Parent requests to be called daily at 9am for status\", \"I have a hard time hearing out of my right ear\", or \"Do not touch me to wake me up as it startles  me\".  Outcome: Progressing  Goal: Absence of Hospital-Acquired Illness or Injury  Outcome: Progressing  Intervention: Prevent Skin Injury  Recent Flowsheet Documentation  Taken 7/6/2025 1557 by Nova Avila, RN  Body Position: position changed independently  Goal: Optimal Comfort and Wellbeing  Outcome: Progressing  Intervention: " Monitor Pain and Promote Comfort  Recent Flowsheet Documentation  Taken 7/6/2025 1557 by Nova Avila RN  Pain Management Interventions: medication (see MAR)  Intervention: Provide Person-Centered Care  Recent Flowsheet Documentation  Taken 7/6/2025 1600 by Nova Avila RN  Trust Relationship/Rapport:   choices provided   care explained  Goal: Readiness for Transition of Care  Outcome: Progressing     Problem: Delirium  Goal: Optimal Coping  Outcome: Progressing  Goal: Improved Behavioral Control  Outcome: Progressing  Intervention: Minimize Safety Risk  Recent Flowsheet Documentation  Taken 7/6/2025 1600 by Nova Avila RN  Trust Relationship/Rapport:   choices provided   care explained  Goal: Improved Attention and Thought Clarity  Outcome: Progressing  Goal: Improved Sleep  Outcome: Progressing     Problem: Anemia  Goal: Anemia Symptom Improvement  Outcome: Progressing     Problem: Comorbidity Management  Goal: Maintenance of Behavioral Health Symptom Control  Outcome: Progressing  Goal: Blood Pressure in Desired Range  Outcome: Progressing     Problem: Pain Chronic (Persistent)  Goal: Optimal Pain Control and Function  Outcome: Progressing  Intervention: Develop Pain Management Plan  Recent Flowsheet Documentation  Taken 7/6/2025 1557 by Nova Avila RN  Pain Management Interventions: medication (see MAR)     Problem: Pain Acute  Goal: Optimal Pain Control and Function  Outcome: Progressing  Intervention: Develop Pain Management Plan  Recent Flowsheet Documentation  Taken 7/6/2025 1557 by Nova Avila RN  Pain Management Interventions: medication (see MAR)     Problem: Electrolyte Imbalance  Goal: Electrolyte Balance  Outcome: Progressing

## 2025-07-08 LAB
HOLD SPECIMEN: NORMAL
POTASSIUM SERPL-SCNC: 4.8 MMOL/L (ref 3.4–5.3)

## 2025-07-08 PROCEDURE — 250N000011 HC RX IP 250 OP 636

## 2025-07-08 PROCEDURE — 99232 SBSQ HOSP IP/OBS MODERATE 35: CPT | Performed by: NURSE PRACTITIONER

## 2025-07-08 PROCEDURE — 120N000001 HC R&B MED SURG/OB

## 2025-07-08 PROCEDURE — 84132 ASSAY OF SERUM POTASSIUM: CPT

## 2025-07-08 PROCEDURE — 250N000012 HC RX MED GY IP 250 OP 636 PS 637: Performed by: NURSE PRACTITIONER

## 2025-07-08 PROCEDURE — 250N000013 HC RX MED GY IP 250 OP 250 PS 637: Performed by: NURSE PRACTITIONER

## 2025-07-08 PROCEDURE — 250N000013 HC RX MED GY IP 250 OP 250 PS 637

## 2025-07-08 PROCEDURE — 36415 COLL VENOUS BLD VENIPUNCTURE: CPT

## 2025-07-08 PROCEDURE — 99232 SBSQ HOSP IP/OBS MODERATE 35: CPT | Mod: GC

## 2025-07-08 RX ORDER — METHADONE HYDROCHLORIDE 5 MG/5ML
5 SOLUTION ORAL EVERY 8 HOURS
Status: DISCONTINUED | OUTPATIENT
Start: 2025-07-08 | End: 2025-07-09

## 2025-07-08 RX ORDER — HYDROMORPHONE HYDROCHLORIDE 2 MG/1
2 TABLET ORAL
Refills: 0 | Status: DISCONTINUED | OUTPATIENT
Start: 2025-07-08 | End: 2025-07-12 | Stop reason: HOSPADM

## 2025-07-08 RX ADMIN — METHOCARBAMOL 1000 MG: 500 TABLET ORAL at 18:09

## 2025-07-08 RX ADMIN — DEXAMETHASONE 2 MG: 2 TABLET ORAL at 06:19

## 2025-07-08 RX ADMIN — AMLODIPINE BESYLATE 5 MG: 5 TABLET ORAL at 22:19

## 2025-07-08 RX ADMIN — OXYCODONE HYDROCHLORIDE 12.5 MG: 5 TABLET ORAL at 06:26

## 2025-07-08 RX ADMIN — VENLAFAXINE HYDROCHLORIDE 37.5 MG: 37.5 CAPSULE, EXTENDED RELEASE ORAL at 09:07

## 2025-07-08 RX ADMIN — GABAPENTIN 100 MG: 100 CAPSULE ORAL at 13:35

## 2025-07-08 RX ADMIN — GABAPENTIN 100 MG: 100 CAPSULE ORAL at 09:07

## 2025-07-08 RX ADMIN — HYDROMORPHONE HYDROCHLORIDE 2 MG: 2 TABLET ORAL at 22:28

## 2025-07-08 RX ADMIN — ENOXAPARIN SODIUM 40 MG: 40 INJECTION SUBCUTANEOUS at 09:07

## 2025-07-08 RX ADMIN — OXYCODONE HYDROCHLORIDE 12.5 MG: 5 TABLET ORAL at 02:53

## 2025-07-08 RX ADMIN — METHOCARBAMOL 1000 MG: 500 TABLET ORAL at 00:15

## 2025-07-08 RX ADMIN — ACETAMINOPHEN 500 MG: 500 TABLET ORAL at 00:14

## 2025-07-08 RX ADMIN — POLYETHYLENE GLYCOL 3350 17 G: 17 POWDER, FOR SOLUTION ORAL at 09:07

## 2025-07-08 RX ADMIN — DEXAMETHASONE 2 MG: 2 TABLET ORAL at 18:09

## 2025-07-08 RX ADMIN — METHADONE HYDROCHLORIDE 5 MG: 5 SOLUTION ORAL at 18:09

## 2025-07-08 RX ADMIN — GABAPENTIN 100 MG: 100 CAPSULE ORAL at 22:18

## 2025-07-08 RX ADMIN — ACETAMINOPHEN 500 MG: 500 TABLET ORAL at 06:19

## 2025-07-08 RX ADMIN — DEXAMETHASONE 2 MG: 2 TABLET ORAL at 12:55

## 2025-07-08 RX ADMIN — AMLODIPINE BESYLATE 5 MG: 5 TABLET ORAL at 09:07

## 2025-07-08 RX ADMIN — HYDROXYZINE HYDROCHLORIDE 10 MG: 10 TABLET, FILM COATED ORAL at 22:29

## 2025-07-08 RX ADMIN — METHOCARBAMOL 1000 MG: 500 TABLET ORAL at 06:19

## 2025-07-08 RX ADMIN — METHADONE HYDROCHLORIDE 5 MG: 5 SOLUTION ORAL at 10:20

## 2025-07-08 RX ADMIN — DEXAMETHASONE 2 MG: 2 TABLET ORAL at 00:14

## 2025-07-08 RX ADMIN — ACETAMINOPHEN 500 MG: 500 TABLET ORAL at 12:55

## 2025-07-08 RX ADMIN — LIDOCAINE 2 PATCH: 4 PATCH TOPICAL at 12:54

## 2025-07-08 RX ADMIN — HYDROMORPHONE HYDROCHLORIDE 0.3 MG: 1 INJECTION, SOLUTION INTRAMUSCULAR; INTRAVENOUS; SUBCUTANEOUS at 01:40

## 2025-07-08 RX ADMIN — METHOCARBAMOL 1000 MG: 500 TABLET ORAL at 12:56

## 2025-07-08 RX ADMIN — ACETAMINOPHEN 500 MG: 500 TABLET ORAL at 18:09

## 2025-07-08 ASSESSMENT — ACTIVITIES OF DAILY LIVING (ADL)
ADLS_ACUITY_SCORE: 63
ADLS_ACUITY_SCORE: 67
ADLS_ACUITY_SCORE: 63
ADLS_ACUITY_SCORE: 67
ADLS_ACUITY_SCORE: 63
ADLS_ACUITY_SCORE: 67
ADLS_ACUITY_SCORE: 63

## 2025-07-08 NOTE — PROGRESS NOTES
PALLIATIVE CARE PROGRESS NOTE  [facility]     Patient Name: Isai Leong  Date of Admission: 6/29/2025   Today the patient was seen for: Follow up patient/family support     Recommendations & Counseling       GOALS OF CARE:   Life-prolonging without limits   Palliative care was not consulted to address goals of care, Isai states in passing that he wishes to follow up with oncology and palliative care outpatient for treatment planning and symptom management.    ADVANCE CARE PLANNING:  No health care directive on file. Per system policy, Surrogate Decision-makers for Patients With Diminished Decision-making Capacity offers guidance on possible decision-makers. In absence of HCD, next of kin will serve as surrogate decision maker.  Spouse Luiz Leong is next of kin.  There is no POLST form on file, defer to patient and/or next of kin for decisions   Code status: Full Code    MEDICAL MANAGEMENT:   #Pain,acute on chronic.  Cancer mediated abdominal pain secondary to large liver mass and ascending colon mass with  multiple lesions.  24 hour OME calculation: 75 mg oxycodone PO, 0.9 mg hydromorphone IV = 130.5 mg OME  Patient reports minimal effect of oxycodone, states hydromorphone works immediately, but does not last long enough to avoid high pain levels.   QTc: 473  Recent Labs   Lab Test 07/07/25  0648   PROTTOTAL 6.9   ALBUMIN 3.0*   BILITOTAL 0.3   ALKPHOS 328*   *   ALT 24      Opioids: Discontinue oxycodone.  Hydromorphone 2-4 mg PO q 4 hours prn breakthrough pain, Hydromorphone 0.3 mg IV q 2 hours prn breakthrough pain if unable to take PO  Methadone 5 mg q 8 hours  Acetaminophen (Tylenol), Scheduled  NSAIDs:  Avoid due to GIB  Muscle Relaxers:Methocarbamol (Robaxin) per primary team 1000 mg q 6 hours  Anti-depressants:  Venlafaxine (Effexor) per primary team. 37.5 mg daily, daily limit of 150 mg due to hepatic impairment  Anti-convulsants:  Gabapentin (Neurontin) 100 mg TID,  do not exceed 300 mg daily due to hepatic impairment  Other medicines for pain: Decadron 2 mg q 6 hours, hydroxyzine 10 mg TID PRN  Topicals: lidocaine patch to abdomen q 24 hours.  12 hour period with patch followed by 12 hour period without patch to avoid systemic toxicity  Heat, Ice, and Meditation  Bowel Regimen: Miralax 1 packet daily, Senna-Docusate 1-2 tablet two times daily prn - consider scheduling Senna-docusate if patient is not at baseline bowel frequency  Palliative care referral for ongoing pain management     #Nausea, disease processes - malignancy  -Pharmacologic management   Ondansetron (Zofran)   Prochlorperazine (COMPAZINE)   May see benefit from Decadron initiation  -Nonpharmacologic management  Small, frequent intake  Assess and avoid aggravating factors  Accupressure (C-band)  Aromatherapy, alcohol swabs known to be effective    PSYCHOSOCIAL/SPIRITUAL:  Family : spouse, extended family  [place] community: No Gnosticist     Palliative Care will continue to follow. Thank you for the consult and allowing us to aid in the care of Isai Leong.    These recommendations have been discussed with primary team, nursing staff.    GIO North CNP  [facility], Palliative Care  Securely message with the [employer] Web Console (learn more here) or  Text page via [facility] Paging/Directory        Assessment          Isai Leong is a 69 year old male with a past medical history of depression, hypertension, headaches and chronic right sided abdominal pain who presented on 6/29 with abdominal pain and palpable abdominal mass.  Patient also reported ongoing nausea, poor appetite and 35 pound weight loss in the past 3 months.      Patient proceeded to have work up including CT imaging which revealed very large liver mass and multiple lesions along ascending colon with additional mass.  He underwent biopsy on 6/30 and results indicated primary colon cancer with liver mets.     Patient has continued to have  significant abdominal pain which has inhibited his ability to mobilize out of bed or use the bathroom.         Today, the patient was seen for:  Symptom management  Patient support      Interval History:     Multidisciplinary collaboration:  No acute changes overnight.  Pain ratings slightly improved overnight per nursing assessments.    Notable medications:  Oxycodone, gabapentin, dexamethasone, methocarbamol, tylenol     Patient/family narrative  Met with Isai at the bedside.  Reviewed plan of care and lab results from 7/7.  Discussed initiation of Methadone for pain management.  He inquired about VIC applications of methadone, dispelled misconceptions and outlined the utility of methadone in cancer pain management.  He verbalized understanding and willingness to trial Methadone.      Reviewed plan for discharge in the coming days and need for palliative care clinic follow up.  He verbalized understanding to this.      Review of Systems:     Besides above, ROS was reviewed and is unremarkable        Physical Exam:   Temp:  [97  F (36.1  C)-98.3  F (36.8  C)] 98.3  F (36.8  C)  Pulse:  [85-91] 91  Resp:  [16-18] 16  BP: (138-174)/() 151/100  SpO2:  [97 %-99 %] 99 %  125 lbs 7 oz    Physical Exam  Vitals reviewed.   Cardiovascular:      Pulses: Normal pulses.   Pulmonary:      Effort: Pulmonary effort is normal.   Skin:     General: Skin is warm and dry.   Neurological:      General: No focal deficit present.      Mental Status: He is alert.   Psychiatric:         Mood and Affect: Mood normal.         Behavior: Behavior normal.         Thought Content: Thought content normal.         Judgment: Judgment normal.           Data Reviewed:     Recent Results (from the past 24 hours)   Potassium   Result Value Ref Range    Potassium 4.8 3.4 - 5.3 mmol/L   Extra Purple Top EDTA (LAB USE ONLY)   Result Value Ref Range    Hold Specimen [facility]          Medical Decision Making       Please see A&P for additional details of  medical decision making.  MANAGEMENT DISCUSSED with the following over the past 24 hours: hospitalist, nursing staff   NOTE(S)/MEDICAL RECORDS REVIEWED over the past 24 hours: progress notes, nursing assessment flowsheets, eMAR  Tests REVIEWED in the past 24 hours:  - See lab/imaging results included in the data section of the note  Medical complexity over the past 24 hours:  - Intensive monitoring for MEDICATION TOXICITY

## 2025-07-08 NOTE — PLAN OF CARE
Problem: Adult Inpatient Plan of Care  Goal: Optimal Comfort and Wellbeing  Intervention: Provide Person-Centered Care  Recent Flowsheet Documentation  Taken 7/8/2025 1701 by Sj Wang RN  Trust Relationship/Rapport:   care explained   emotional support provided  Goal: Readiness for Transition of Care  Outcome: Progressing     Problem: Delirium  Goal: Optimal Coping  Outcome: Progressing  Goal: Improved Behavioral Control  Intervention: Minimize Safety Risk  Recent Flowsheet Documentation  Taken 7/8/2025 1701 by Sj Wang RN  Trust Relationship/Rapport:   care explained   emotional support provided  Goal: Improved Attention and Thought Clarity  Intervention: Maximize Cognitive Function  Recent Flowsheet Documentation  Taken 7/8/2025 1701 by Sj Wang RN  Reorientation Measures: clock in view     Problem: Comorbidity Management  Goal: Maintenance of Behavioral Health Symptom Control  Outcome: Progressing     Problem: Pain Acute  Goal: Optimal Pain Control and Function  Intervention: Optimize Psychosocial Wellbeing  Recent Flowsheet Documentation  Taken 7/8/2025 1701 by Sj Wang RN  Diversional Activities: television   Goal Outcome Evaluation:       Patient makes needs known appropriately, abdominal pain is well tolerated with present regimen. Appetite is good on regular diet, up and about in the room, wheeled out side per family and tolerated well. Up to the bathroom independently voiding freely with adequate out put. Afebrile, will continue to monitor.    Sj Wang RN                        megan

## 2025-07-08 NOTE — PLAN OF CARE
Problem: Adult Inpatient Plan of Care  Goal: Optimal Comfort and Wellbeing  Outcome: Progressing  Intervention: Monitor Pain and Promote Comfort  Recent Flowsheet Documentation  Taken 7/7/2025 1849 by Cynthia Rodgers, RN  Pain Management Interventions: medication (see MAR)  Taken 7/7/2025 1842 by Cynthia Rodgers, RN  Pain Management Interventions: medication (see MAR)  Intervention: Provide Person-Centered Care  Recent Flowsheet Documentation  Taken 7/7/2025 1800 by Cynthia Rodgers, RN  Trust Relationship/Rapport:   care explained   emotional support provided   Goal Outcome Evaluation:    Informed pt about cares and pain medication schedule. Pt's pain managed with scheduled oxycodone, gabapentin and robaxin as well as PRN dilaudid for breakthrough pain. Pt takes adequate fluid intake and has been walking independently to the bathroom to void and have BM. Refuse bed alarm and gait belt.

## 2025-07-08 NOTE — CONSULTS
Received consult for elevated readmission risk score. Initial care management assessment completed 7/6. Care management following to assist with safe discharge planning.       VISHAL Webster at 8:12 AM on 07/08/25

## 2025-07-08 NOTE — PROGRESS NOTES
[facility]    Medicine Progress Note - Hospitalist Service    Date of Admission:  6/29/2025    Assessment & Plan      Isai Leong is a 69 year old male admitted on 6/29/2025. He has PMH of depression, hypertension, headaches and is admitted for chronic right sided abdominal pain found to have a large mass in his colon with likely metastatic liver disease. Remains hospitalized for pain control - palliative following.     Lobulated Mass of Ascending Colon, with Ileum and Lymph Node Involvement  Metastatic Liver Lesions  Presumed cancer pain  Chronic right abdominal pain with masses as above, CT imaging with very large liver and multiple lesions along with the ascending colon mass. Physical exam consistent with large nodular liver, likely primary cancer of the colon with metastasis to the liver. Weight loss, poor appetite, also suggestive of underlying cancer. S/p liver bx w/IR on 6/30. Some improvement in abdominal pain w/decadron and gabapentin overnight. Palliative following - given increasing total daily MME, CMP wnl, and EKG w/normal Qtc, switched to methadone today. Will continue to monitor.   -Palliative consult, recs appreciated     -Pain regimen:   - Gabapentin 100mg TID, do not exceed 300mg d    - Decadron 2mg q6h    - Methadone 5mg q8h -- repeat CMP tomorrow 7/9.   - Dilaudid 1-2mg PO q3h PRN, 0.3mg IV q3h PRN   - Hydroxyzine 10mg TID PRN     - Lidocaine patch q24h    -Tylenol 500mg q6h, avoid additional tylenol given liver disease   -Robaxin 1000mg q6h    -Venlafaxine 37.5mg d - do not exceed 150mg/d d/t hepatic impairment    -Avoid NSAIDs given GI bleed  - Bowel regimen:   -Miralax daily    -Senna BID PRN   - Referral to palliative clinic on discharge   -Liver bx showing colon cancer w/mets, genotype pending  -Add-on CEA for outpatient oncology follow-up  -Hold on CT chest per oncology curbside   -Referral for [facility] on discharge     R inguinal hernia  Enlarged, fluid  palpated, but reducible in setting of colon cancer w/mets discussed above. Not noted on CT AP on admission.   -Continue to monitor   -Outpatient surgical follow-up if remains benign  -Pain management per above    Microcytic anemia - resolved  MCV 50, Hg 7 on admission, s/p total 2u pRBC. Remains hemodynamically stable and hgb between 9-10. Ferritin 100. PBS w/slight target cells and sickle cells - unclear significance w/lack of personal/family hx of this per chart rvw.   - CBC if becomes hemodynamically unstable     Lactic Acidosis  Likely Type A lactic acidosis, improved with fluid resuscitation and will be getting RBC transfusions.   - No further laboratory testing required    Itching   - Hydroxyzine PRN  - Zyrtec PRN for itching    Housing concerns  Patient requested to speak w/SW to discuss housing. Remote hx of homelessness. He is currently living in an apartment and is concerned about losing this. Did not explicate why with me.   -CM/SW consult, recs appreciated           Diet: Regular Diet Adult  Snacks/Supplements Adult: Ensure Plus High Protein; Between Meals    DVT Prophylaxis: VTE Prophylaxis contraindicated due to acute anemia   Her Catheter: Not present  Lines: None     Cardiac Monitoring: None  Code Status: Full Code      Clinically Significant Risk Factors          # Hypochloremia: Lowest Cl = 97 mmol/L in last 2 days, will monitor as appropriate      # Hypoalbuminemia: Lowest albumin = 2.8 g/dL at 6/29/2025 11:50 PM, will monitor as appropriate     # Hypertension: Noted on problem list             # Severe Malnutrition: based on nutrition assessment and treatment provided per dietitian's recommendations.    # Financial/Environmental Concerns: unable to afford rent/mortgage;other (see comments) (Patient at risk for eviction a he is3 months behind in his rent)         Social Drivers of Health    Tobacco Use: High Risk (5/11/2021)    Received from [employer]    Patient History     Smoking Tobacco  Use: Every Day     Smokeless Tobacco Use: Unknown   Interpersonal Safety: High Risk (7/1/2025)    Interpersonal Safety     Do you feel physically and emotionally safe where you currently live?: No     Within the past 12 months, have you been hit, slapped, kicked or otherwise physically hurt by someone?: No     Within the past 12 months, have you been humiliated or emotionally abused in other ways by your partner or ex-partner?: No          Disposition Plan   Medically Ready for Discharge: Anticipated Tomorrow           The patient's care was discussed with the Attending Physician, Dr. Negron.    Merlin Guevara MD  Hospitalist Service  [facility]  Securely message with [email or web address] (more info)  Text page via [facility] Paging/Directory   ______________________________________________________________________    Interval History   NAEO. BM x1 overnight, passing gas. Abdominal pain minimally improved w/decadron. Denies dyspnea. Appetite improved - ate most of breakfast.     Physical Exam   Vital Signs: Temp: 98.3  F (36.8  C) Temp src: Oral BP: (!) 151/100 (RN NOTIFIED) Pulse: 91   Resp: 16 SpO2: 99 % O2 Device: None (Room air)    Weight: 125 lbs 7 oz    Physical Exam  Constitutional:       General: He is not in acute distress.     Appearance: He is not toxic-appearing.   HENT:      Head: Normocephalic and atraumatic.   Pulmonary:      Effort: Pulmonary effort is normal.   Abdominal:      General: There is distension.      Tenderness: There is abdominal tenderness. There is guarding and rebound.   Genitourinary:     Comments: Enlarged, but reducible inguinal hernia R groin. Fluid filled.   Musculoskeletal:      Right lower leg: No edema.      Left lower leg: No edema.   Neurological:      General: No focal deficit present.      Mental Status: He is alert and oriented to person, place, and time.   Psychiatric:         Mood and Affect: Mood normal.         Behavior: Behavior normal.           Medical  Decision Making       ------------------ MEDICAL DECISION MAKING ------------------------------------------------------------------------------------------------------      Data   ------------------------- PAST 24 HR DATA REVIEWED -----------------------------------------------

## 2025-07-08 NOTE — PLAN OF CARE
Problem: Pain Chronic (Persistent)  Goal: Optimal Pain Control and Function  Intervention: Optimize Psychosocial Wellbeing  Recent Flowsheet Documentation  Taken 7/8/2025 0015 by Yanna Peterson RN  Diversional Activities: television  Family/Support System Care: support provided  Intervention: Develop Pain Management Plan  Recent Flowsheet Documentation  Taken 7/8/2025 0140 by Yanna Peterson RN  Pain Management Interventions: medication (see MAR)  Taken 7/8/2025 0014 by Yanna Peterson RN  Pain Management Interventions: medication (see MAR)  Intervention: Manage Persistent Pain  Recent Flowsheet Documentation  Taken 7/8/2025 0015 by Yanna Peterson RN  Medication Review/Management: medications reviewed   Goal Outcome Evaluation:       Pt c/o pain in the abdomen, on scheduled pain medication and prn Hydromorphone for breakthrough pain. Pain came down to 6-7 from 8-9/10. He is independent in the room.SBP in the 170's, MD aware, no new orders given.

## 2025-07-09 ENCOUNTER — APPOINTMENT (OUTPATIENT)
Dept: PHYSICAL THERAPY | Facility: HOSPITAL | Age: 70
DRG: 435 | End: 2025-07-09
Payer: MEDICARE

## 2025-07-09 LAB
ALBUMIN SERPL BCG-MCNC: 3.1 G/DL (ref 3.5–5.2)
ALP SERPL-CCNC: 391 U/L (ref 40–150)
ALT SERPL W P-5'-P-CCNC: 36 U/L (ref 0–70)
ANION GAP SERPL CALCULATED.3IONS-SCNC: 8 MMOL/L (ref 7–15)
AST SERPL W P-5'-P-CCNC: 98 U/L (ref 0–45)
BILIRUB SERPL-MCNC: <0.2 MG/DL
BUN SERPL-MCNC: 37.1 MG/DL (ref 8–23)
CALCIUM SERPL-MCNC: 9.6 MG/DL (ref 8.8–10.4)
CHLORIDE SERPL-SCNC: 93 MMOL/L (ref 98–107)
CREAT SERPL-MCNC: 0.81 MG/DL (ref 0.67–1.17)
EGFRCR SERPLBLD CKD-EPI 2021: >90 ML/MIN/1.73M2
GLUCOSE SERPL-MCNC: 120 MG/DL (ref 70–99)
HCO3 SERPL-SCNC: 33 MMOL/L (ref 22–29)
MCV RBC AUTO: 56 FL (ref 78–100)
PLATELET # BLD AUTO: 457 10E3/UL (ref 150–450)
POTASSIUM SERPL-SCNC: 5.2 MMOL/L (ref 3.4–5.3)
POTASSIUM SERPL-SCNC: 5.5 MMOL/L (ref 3.4–5.3)
PROT SERPL-MCNC: 6.6 G/DL (ref 6.4–8.3)
SODIUM SERPL-SCNC: 134 MMOL/L (ref 135–145)

## 2025-07-09 PROCEDURE — 99232 SBSQ HOSP IP/OBS MODERATE 35: CPT | Mod: GC

## 2025-07-09 PROCEDURE — 120N000001 HC R&B MED SURG/OB

## 2025-07-09 PROCEDURE — 36415 COLL VENOUS BLD VENIPUNCTURE: CPT

## 2025-07-09 PROCEDURE — 250N000012 HC RX MED GY IP 250 OP 636 PS 637: Performed by: NURSE PRACTITIONER

## 2025-07-09 PROCEDURE — 84155 ASSAY OF PROTEIN SERUM: CPT

## 2025-07-09 PROCEDURE — 84132 ASSAY OF SERUM POTASSIUM: CPT

## 2025-07-09 PROCEDURE — 97161 PT EVAL LOW COMPLEX 20 MIN: CPT | Mod: GP

## 2025-07-09 PROCEDURE — 99232 SBSQ HOSP IP/OBS MODERATE 35: CPT | Performed by: NURSE PRACTITIONER

## 2025-07-09 PROCEDURE — 250N000013 HC RX MED GY IP 250 OP 250 PS 637

## 2025-07-09 PROCEDURE — 250N000011 HC RX IP 250 OP 636

## 2025-07-09 PROCEDURE — 85049 AUTOMATED PLATELET COUNT: CPT

## 2025-07-09 PROCEDURE — 250N000013 HC RX MED GY IP 250 OP 250 PS 637: Performed by: NURSE PRACTITIONER

## 2025-07-09 PROCEDURE — 97116 GAIT TRAINING THERAPY: CPT | Mod: GP

## 2025-07-09 RX ORDER — NICOTINE POLACRILEX 4 MG
15-30 LOZENGE BUCCAL
Status: DISCONTINUED | OUTPATIENT
Start: 2025-07-09 | End: 2025-07-12 | Stop reason: HOSPADM

## 2025-07-09 RX ORDER — METHADONE HYDROCHLORIDE 5 MG/5ML
5 SOLUTION ORAL EVERY 12 HOURS
Status: DISCONTINUED | OUTPATIENT
Start: 2025-07-09 | End: 2025-07-12 | Stop reason: HOSPADM

## 2025-07-09 RX ORDER — DEXTROSE MONOHYDRATE 25 G/50ML
25-50 INJECTION, SOLUTION INTRAVENOUS
Status: DISCONTINUED | OUTPATIENT
Start: 2025-07-09 | End: 2025-07-12 | Stop reason: HOSPADM

## 2025-07-09 RX ADMIN — GABAPENTIN 100 MG: 100 CAPSULE ORAL at 14:03

## 2025-07-09 RX ADMIN — HYDROMORPHONE HYDROCHLORIDE 2 MG: 2 TABLET ORAL at 14:08

## 2025-07-09 RX ADMIN — VENLAFAXINE HYDROCHLORIDE 37.5 MG: 37.5 CAPSULE, EXTENDED RELEASE ORAL at 08:43

## 2025-07-09 RX ADMIN — METHADONE HYDROCHLORIDE 5 MG: 5 SOLUTION ORAL at 02:15

## 2025-07-09 RX ADMIN — GABAPENTIN 100 MG: 100 CAPSULE ORAL at 08:43

## 2025-07-09 RX ADMIN — ENOXAPARIN SODIUM 40 MG: 40 INJECTION SUBCUTANEOUS at 08:42

## 2025-07-09 RX ADMIN — POLYETHYLENE GLYCOL 3350 17 G: 17 POWDER, FOR SOLUTION ORAL at 21:02

## 2025-07-09 RX ADMIN — METHADONE HYDROCHLORIDE 5 MG: 5 SOLUTION ORAL at 09:30

## 2025-07-09 RX ADMIN — AMLODIPINE BESYLATE 5 MG: 5 TABLET ORAL at 21:01

## 2025-07-09 RX ADMIN — ACETAMINOPHEN 500 MG: 500 TABLET ORAL at 06:02

## 2025-07-09 RX ADMIN — METHOCARBAMOL 1000 MG: 500 TABLET ORAL at 06:02

## 2025-07-09 RX ADMIN — ACETAMINOPHEN 500 MG: 500 TABLET ORAL at 18:08

## 2025-07-09 RX ADMIN — METHOCARBAMOL 1000 MG: 500 TABLET ORAL at 00:07

## 2025-07-09 RX ADMIN — ACETAMINOPHEN 500 MG: 500 TABLET ORAL at 00:14

## 2025-07-09 RX ADMIN — GABAPENTIN 100 MG: 100 CAPSULE ORAL at 21:01

## 2025-07-09 RX ADMIN — DEXAMETHASONE 2 MG: 2 TABLET ORAL at 06:02

## 2025-07-09 RX ADMIN — DEXAMETHASONE 2 MG: 2 TABLET ORAL at 00:15

## 2025-07-09 RX ADMIN — METHOCARBAMOL 1000 MG: 500 TABLET ORAL at 12:19

## 2025-07-09 RX ADMIN — POLYETHYLENE GLYCOL 3350 17 G: 17 POWDER, FOR SOLUTION ORAL at 08:42

## 2025-07-09 RX ADMIN — DEXAMETHASONE 2 MG: 2 TABLET ORAL at 18:08

## 2025-07-09 RX ADMIN — AMLODIPINE BESYLATE 5 MG: 5 TABLET ORAL at 08:42

## 2025-07-09 RX ADMIN — ACETAMINOPHEN 500 MG: 500 TABLET ORAL at 12:19

## 2025-07-09 RX ADMIN — METHOCARBAMOL 1000 MG: 500 TABLET ORAL at 18:08

## 2025-07-09 RX ADMIN — DEXAMETHASONE 2 MG: 2 TABLET ORAL at 12:18

## 2025-07-09 RX ADMIN — METHADONE HYDROCHLORIDE 5 MG: 5 SOLUTION ORAL at 21:02

## 2025-07-09 RX ADMIN — HYDROMORPHONE HYDROCHLORIDE 2 MG: 2 TABLET ORAL at 05:06

## 2025-07-09 RX ADMIN — LIDOCAINE 2 PATCH: 4 PATCH TOPICAL at 12:18

## 2025-07-09 ASSESSMENT — ACTIVITIES OF DAILY LIVING (ADL)
ADLS_ACUITY_SCORE: 63
ADLS_ACUITY_SCORE: 62
ADLS_ACUITY_SCORE: 63

## 2025-07-09 NOTE — PROGRESS NOTES
[facility]    Medicine Progress Note - Hospitalist Service    Date of Admission:  6/29/2025    Assessment & Plan      Isai Leong is a 69 year old male admitted on 6/29/2025. He has PMH of depression, hypertension, headaches and is admitted for chronic right sided abdominal pain found to have a large mass in his colon with likely metastatic liver disease. Remains hospitalized for pain control - palliative following.     Lobulated Mass of Ascending Colon, with Ileum and Lymph Node Involvement  Metastatic Liver Lesions  Presumed cancer pain  Chronic right abdominal pain with masses as above, CT imaging with very large liver and multiple lesions along with the ascending colon mass. Physical exam consistent with large nodular liver, likely primary cancer of the colon with metastasis to the liver. Weight loss, poor appetite, also suggestive of underlying cancer. S/p liver bx w/IR on 6/30. Some improvement in abdominal pain w/decadron and gabapentin overnight. Palliative following - given increasing total daily MME, CMP wnl, and EKG w/normal Qtc, methadone for pain control initiated 7/8. Patient is more drowsy and confused today, unsteady on feet, which could be related to methadone dosing.   -Palliative consult, recs appreciated     -Pain regimen:   - Gabapentin 100mg TID, do not exceed 300mg d    - Decadron 2mg q6h    - Methadone 5mg q8h >> q12h   - Dilaudid 1-2mg PO q3h PRN, 0.3mg IV q3h PRN   - Hydroxyzine 10mg TID PRN     - Lidocaine patch q24h    -Tylenol 500mg q6h, avoid additional tylenol given liver disease   -Robaxin 1000mg q6h    -Venlafaxine 37.5mg d - do not exceed 150mg/d d/t hepatic impairment    -Avoid NSAIDs given GI bleed  - Bowel regimen:   -Miralax daily    -Senna BID PRN   - Referral to palliative clinic on discharge   -PT consult   -Liver bx showing colon cancer w/mets, genotype pending  -Add-on CEA for outpatient oncology follow-up  -Hold on CT chest per oncology curbsalaina    -Referral for [facility] on discharge     Hyperkalemia  Hyponatremia   K 5.5, Na 134 this AM - unclear etiology. Cr and GFR wnl. Possibly incidental vs related to PO intake.   -Recheck K  -Mild hyperkalemia protocol   -Correct as needed     R inguinal hernia  Enlarged, fluid palpated, but reducible in setting of colon cancer w/mets discussed above. Not noted on CT AP on admission.   -Continue to monitor   -Outpatient surgical follow-up if remains benign  -Pain management per above    Microcytic anemia - resolved  MCV 50, Hg 7 on admission, s/p total 2u pRBC. Remains hemodynamically stable and hgb between 9-10. Ferritin 100. PBS w/slight target cells and sickle cells - unclear significance w/lack of personal/family hx of this per chart rvw.   - CBC if becomes hemodynamically unstable     Lactic Acidosis  Likely Type A lactic acidosis, improved with fluid resuscitation and will be getting RBC transfusions.   - No further laboratory testing required    Itching   - Hydroxyzine PRN  - Zyrtec PRN for itching    Housing concerns  Patient requested to speak w/SW to discuss housing. Remote hx of homelessness. He is currently living in an apartment and is concerned about losing this. Did not explicate why with me.   -CM/SW consult, recs appreciated           Diet: Regular Diet Adult  Snacks/Supplements Adult: Ensure Plus High Protein; Between Meals    DVT Prophylaxis: VTE Prophylaxis contraindicated due to acute anemia   Her Catheter: Not present  Lines: None     Cardiac Monitoring: None  Code Status: Full Code      Clinically Significant Risk Factors        # Hyperkalemia: Highest K = 5.5 mmol/L in last 2 days, will monitor as appropriate  # Hyponatremia: Lowest Na = 134 mmol/L in last 2 days, will monitor as appropriate  # Hypochloremia: Lowest Cl = 93 mmol/L in last 2 days, will monitor as appropriate      # Hypoalbuminemia: Lowest albumin = 2.8 g/dL at 6/29/2025 11:50 PM, will monitor as appropriate     #  Hypertension: Noted on problem list             # Severe Malnutrition: based on nutrition assessment and treatment provided per dietitian's recommendations.    # Financial/Environmental Concerns: unable to afford rent/mortgage;other (see comments) (Patient at risk for eviction a he is3 months behind in his rent)         Social Drivers of Health    Tobacco Use: High Risk (5/11/2021)    Received from [employer]    Patient History     Smoking Tobacco Use: Every Day     Smokeless Tobacco Use: Unknown   Interpersonal Safety: High Risk (7/1/2025)    Interpersonal Safety     Do you feel physically and emotionally safe where you currently live?: No     Within the past 12 months, have you been hit, slapped, kicked or otherwise physically hurt by someone?: No     Within the past 12 months, have you been humiliated or emotionally abused in other ways by your partner or ex-partner?: No          Disposition Plan   Medically Ready for Discharge: Anticipated Tomorrow           The patient's care was discussed with the Attending Physician, Dr. Negron.    Merlin Guevara MD  Hospitalist Service  [facility]  Securely message with [employer] (more info)  Text page via [employer] Paging/Directory   ______________________________________________________________________    Interval History   NAEO. Patient thinks methadone is helping his abdominal pain, but does get woken up at night because methadone wears off? BM x1. He recounts a story of going on a walk with his nephew yesterday and this made him very tired and painful. However, RN reports that she did not get sign-out on this, also no reviews of this in the chart. Denies chest pain, heart palpitations. PO intake okay.     Physical Exam   Vital Signs: Temp: 98  F (36.7  C) Temp src: Oral BP: 133/84 Pulse: 77   Resp: 14 SpO2: 94 % O2 Device: None (Room air)    Weight: 125 lbs 7 oz    Physical Exam  Constitutional:       General: He is not in acute distress.      Appearance: He is not toxic-appearing.      Comments: Drowsy.    HENT:      Head: Normocephalic and atraumatic.   Pulmonary:      Effort: Pulmonary effort is normal.   Abdominal:      General: There is distension.      Tenderness: There is abdominal tenderness. There is guarding and rebound.   Genitourinary:     Comments: Enlarged, but reducible inguinal hernia R groin. Fluid filled.   Musculoskeletal:      Right lower leg: No edema.      Left lower leg: No edema.   Neurological:      General: No focal deficit present.      Mental Status: He is alert and oriented to person, place, and time.      Comments: Rambling, tangential speech. Mood appropriate. Insight poor.    Psychiatric:         Mood and Affect: Mood normal.         Behavior: Behavior normal.           Medical Decision Making       ------------------ MEDICAL DECISION MAKING ------------------------------------------------------------------------------------------------------      Data   ------------------------- PAST 24 HR DATA REVIEWED -----------------------------------------------

## 2025-07-09 NOTE — PROGRESS NOTES
[facility]    Medicine Progress Note - Hospitalist Service    Date of Admission:  6/29/2025    Assessment & Plan      Isai Leong is a 69 year old male admitted on 6/29/2025. He has PMH of depression, hypertension, headaches and is admitted for chronic right sided abdominal pain found to have a large mass in his colon with likely metastatic liver disease. Remains hospitalized for pain control - palliative following.     Lobulated Mass of Ascending Colon, with Ileum and Lymph Node Involvement  Metastatic Liver Lesions  Presumed cancer pain  Chronic right abdominal pain with masses as above, CT imaging with very large liver and multiple lesions along with the ascending colon mass. Physical exam consistent with large nodular liver, likely primary cancer of the colon with metastasis to the liver. Weight loss, poor appetite, also suggestive of underlying cancer. S/p liver bx w/IR on 6/30. Some improvement in abdominal pain w/decadron and gabapentin overnight. Palliative following - given increasing total daily MME, CMP wnl, and EKG w/normal Qtc, methadone for pain control initiated 7/8. Patient is more drowsy and confused today, unsteady on feet, which could be related to methadone dosing.   -Palliative consult, recs appreciated     -Pain regimen:   - Gabapentin 100mg TID, do not exceed 300mg d    - Decadron 2mg q6h    - Methadone 5mg q8h -- repeat CMP tomorrow 7/9.   - Dilaudid 1-2mg PO q3h PRN, 0.3mg IV q3h PRN   - Hydroxyzine 10mg TID PRN     - Lidocaine patch q24h    -Tylenol 500mg q6h, avoid additional tylenol given liver disease   -Robaxin 1000mg q6h    -Venlafaxine 37.5mg d - do not exceed 150mg/d d/t hepatic impairment    -Avoid NSAIDs given GI bleed  - Bowel regimen:   -Miralax daily    -Senna BID PRN   - Referral to palliative clinic on discharge   -PT consult   -Liver bx showing colon cancer w/mets, genotype pending  -Add-on CEA for outpatient oncology follow-up  -Hold on CT chest  per oncology curbside   -Referral for [facility] on discharge     Hyperkalemia  Hyponatremia   K 5.5, Na 134 this AM - unclear etiology. Cr and GFR wnl. Possibly incidental vs related to PO intake.   -Recheck K  -Mild hyperkalemia protocol   -Correct as needed     R inguinal hernia  Enlarged, fluid palpated, but reducible in setting of colon cancer w/mets discussed above. Not noted on CT AP on admission.   -Continue to monitor   -Outpatient surgical follow-up if remains benign  -Pain management per above    Microcytic anemia - resolved  MCV 50, Hg 7 on admission, s/p total 2u pRBC. Remains hemodynamically stable and hgb between 9-10. Ferritin 100. PBS w/slight target cells and sickle cells - unclear significance w/lack of personal/family hx of this per chart rvw.   - CBC if becomes hemodynamically unstable     Lactic Acidosis  Likely Type A lactic acidosis, improved with fluid resuscitation and will be getting RBC transfusions.   - No further laboratory testing required    Itching   - Hydroxyzine PRN  - Zyrtec PRN for itching    Housing concerns  Patient requested to speak w/SW to discuss housing. Remote hx of homelessness. He is currently living in an apartment and is concerned about losing this. Did not explicate why with me.   -CM/SW consult, recs appreciated           Diet: Regular Diet Adult  Snacks/Supplements Adult: Ensure Plus High Protein; Between Meals    DVT Prophylaxis: VTE Prophylaxis contraindicated due to acute anemia   Her Catheter: Not present  Lines: None     Cardiac Monitoring: None  Code Status: Full Code      Clinically Significant Risk Factors        # Hyperkalemia: Highest K = 5.5 mmol/L in last 2 days, will monitor as appropriate  # Hyponatremia: Lowest Na = 134 mmol/L in last 2 days, will monitor as appropriate  # Hypochloremia: Lowest Cl = 93 mmol/L in last 2 days, will monitor as appropriate      # Hypoalbuminemia: Lowest albumin = 2.8 g/dL at 6/29/2025 11:50 PM, will monitor as  appropriate     # Hypertension: Noted on problem list             # Severe Malnutrition: based on nutrition assessment and treatment provided per dietitian's recommendations.    # Financial/Environmental Concerns: unable to afford rent/mortgage;other (see comments) (Patient at risk for eviction a he is3 months behind in his rent)         Social Drivers of Health    Tobacco Use: High Risk (5/11/2021)    Received from [employer]    Patient History     Smoking Tobacco Use: Every Day     Smokeless Tobacco Use: Unknown   Interpersonal Safety: High Risk (7/1/2025)    Interpersonal Safety     Do you feel physically and emotionally safe where you currently live?: No     Within the past 12 months, have you been hit, slapped, kicked or otherwise physically hurt by someone?: No     Within the past 12 months, have you been humiliated or emotionally abused in other ways by your partner or ex-partner?: No          Disposition Plan   Medically Ready for Discharge: Anticipated Tomorrow           The patient's care was discussed with the Attending Physician, Dr. Negron.    Merlin Guevara MD  Hospitalist Service  [facility]  Securely message with [employer] (more info)  Text page via [employer] Paging/Directory   ______________________________________________________________________    Interval History   NAEO. Patient thinks methadone is helping his abdominal pain, but does get woken up at night because methadone wears off? BM x1. He recounts a story of going on a walk with his nephew yesterday and this made him very tired and painful. However, RN reports that she did not get sign-out on this, also no reviews of this in the chart. Denies chest pain, heart palpitations. PO intake okay.     Physical Exam   Vital Signs: Temp: 98  F (36.7  C) Temp src: Oral BP: 133/84 Pulse: 77   Resp: 14 SpO2: 94 % O2 Device: None (Room air)    Weight: 125 lbs 7 oz    Physical Exam  Constitutional:       General: He is not in acute  distress.     Appearance: He is not toxic-appearing.      Comments: Drowsy.    HENT:      Head: Normocephalic and atraumatic.   Pulmonary:      Effort: Pulmonary effort is normal.   Abdominal:      General: There is distension.      Tenderness: There is abdominal tenderness. There is guarding and rebound.   Genitourinary:     Comments: Enlarged, but reducible inguinal hernia R groin. Fluid filled.   Musculoskeletal:      Right lower leg: No edema.      Left lower leg: No edema.   Neurological:      General: No focal deficit present.      Mental Status: He is alert and oriented to person, place, and time.      Comments: Rambling, tangential speech. Mood appropriate. Insight poor.    Psychiatric:         Mood and Affect: Mood normal.         Behavior: Behavior normal.           Medical Decision Making       ------------------ MEDICAL DECISION MAKING ------------------------------------------------------------------------------------------------------      Data   ------------------------- PAST 24 HR DATA REVIEWED -----------------------------------------------

## 2025-07-09 NOTE — PROGRESS NOTES
"PT Evaluation   07/09/25 1563   Appointment Info   Signing Clinician's Name / Credentials (PT) Annita Del Cid, FELIPE   Living Environment   People in Home alone   Current Living Arrangements apartment   Home Accessibility no concerns  (elevator)   Self-Care   Usual Activity Tolerance good   Current Activity Tolerance moderate   Regular Exercise Yes   Activity/Exercise Type walking   Equipment Currently Used at Home none   Fall history within last six months no   Activity/Exercise/Self-Care Comment Pt reports being IND at baseline. Per pt, nephew lives close by and would be able to assist if needed.   General Information   Onset of Illness/Injury or Date of Surgery 06/29/25   Referring Physician Merlin Guevara MD   Patient/Family Therapy Goals Statement (PT) none stated   Pertinent History of Current Problem (include personal factors and/or comorbidities that impact the POC) Per chart, \"Isai Leong is a 69 year old male admitted on 6/29/2025. He has PMH of depression, hypertension, headaches and is admitted for chronic right sided abdominal pain found to have a large mass in his colon with likely metastatic liver disease. Remains hospitalized for pain control - palliative following.\"   Existing Precautions/Restrictions fall   Cognition   Affect/Mental Status (Cognition) WFL   Orientation Status (Cognition)   (did not formally assess - pt appeared A&Ox4)   Follows Commands (Cognition) WFL   Pain Assessment   Patient Currently in Pain Yes, see Vital Sign flowsheet  (pain in abdomen)   Range of Motion (ROM)   Range of Motion ROM is WFL   Strength (Manual Muscle Testing)   Strength (Manual Muscle Testing) Deficits observed during functional mobility   Bed Mobility   Bed Mobility supine-sit;sit-supine   Supine-Sit Harrington (Bed Mobility) minimum assist (75% patient effort)   Sit-Supine Harrington (Bed Mobility) minimum assist (75% patient effort)   Impairments Contributing to Impaired Bed Mobility decreased strength "   Assistive Device (Bed Mobility) bed rails   Comment, (Bed Mobility) Pt reached for PT's hand for pulling supine>sit. PT assisted w pt's legs sit>supine per pt's request.   Transfers   Transfers sit-stand transfer   Impairments Contributing to Impaired Transfers decreased strength   Sit-Stand Transfer   Sit-Stand Register (Transfers) contact guard   Assistive Device (Sit-Stand Transfers) walker, front-wheeled   Gait/Stairs (Locomotion)   Register Level (Gait) contact guard   Assistive Device (Gait) walker, front-wheeled   Distance in Feet (Gait) 50   Pattern (Gait) swing-through   Deviations/Abnormal Patterns (Gait) roberto decreased;gait speed decreased;stride length decreased;other (see comments)  (dec foot clearance)   Balance   Balance other (describe)   Balance Comments Pt was not using an AD at baseline but has been needing to use a FWW for stability since admission.   Clinical Impression   Criteria for Skilled Therapeutic Intervention Yes, treatment indicated   PT Diagnosis (PT) impaired functional mobility, gait abnormality   Influenced by the following impairments decreased strength, decreased endurance, impaired balance   Functional limitations due to impairments gait, bed mobility, transfers   Clinical Presentation (PT Evaluation Complexity) stable   Clinical Presentation Rationale pt presents as medically diagnosed   Clinical Decision Making (Complexity) low complexity   Planned Therapy Interventions (PT) balance training;bed mobility training;gait training;home exercise program;neuromuscular re-education;patient/family education;strengthening;transfer training   Risk & Benefits of therapy have been explained evaluation/treatment results reviewed;patient   PT Total Evaluation Time   PT Yuliyaal, Low Complexity Minutes (43827) 16   Physical Therapy Goals   PT Frequency Daily   PT Predicted Duration/Target Date for Goal Attainment 07/16/25   PT Goals Bed Mobility;Transfers;Gait   PT: Bed Mobility  Independent;Supine to/from sit   PT: Transfers Modified independent;Sit to/from stand;Assistive device   PT: Gait Modified independent;Assistive device;Rolling walker;Greater than 200 feet   Interventions   Interventions Quick Adds Gait Training   Gait Training   Gait Training Minutes (43421) 9   Symptoms Noted During/After Treatment (Gait Training) fatigue   Treatment Detail/Skilled Intervention Pt amb an additional 200' in hallway w FWW, CGA. Pt demonstrated very slow gait speed and flexed posture. v/c for upright posture and keeping walker closer to body for safety during amb. Pt denied dizziness w activity but reported constant abdominal pain. Pt requesting to trial cane next session. Left pt supine w all needs in reach.   Distance in Feet 200   Hendricks Level (Gait Training) contact guard   Physical Assistance Level (Gait Training) verbal cues;1 person assist   Assistive Device (Gait Training) rolling walker   Pattern Analysis (Gait Training) swing-through gait   Gait Analysis Deviations decreased roberto;decreased step length;decreased stride length;decreased toe-to-floor clearance   Impairments (Gait Analysis/Training) balance impaired;pain;strength decreased   PT Discharge Planning   PT Plan Bring cane to trial - bed mobility, transfers, progress gait, check if pt has abd binder for comfort?   PT Discharge Recommendation (DC Rec) Transitional Care Facility   PT Rationale for DC Rec Pt lives alone, does not use an AD, and reports being IND at baseline. Currently pt is requiring Delmer for bed mobility and is CGA w FWW for transfers and amb. If pt continues to progress while in hospital, may be appropriate for home with home PT.   PT Brief overview of current status bed mobility Delmer; transfers CGA; amb w ' CGA   PT Total Distance Amb During Session (feet) 250   PT Equipment Needed at Discharge walker, rolling   Physical Therapy Time and Intention   Timed Code Treatment Minutes 9   Total Session Time  (sum of timed and untimed services) 25

## 2025-07-09 NOTE — PLAN OF CARE
Problem: Pain Acute  Goal: Optimal Pain Control and Function  Outcome: Progressing  Intervention: Optimize Psychosocial Wellbeing  Recent Flowsheet Documentation  Taken 7/9/2025 0900 by Ayaka Valles RN  Diversional Activities: television  Intervention: Develop Pain Management Plan  Recent Flowsheet Documentation  Taken 7/9/2025 0843 by Ayaka Valles, RN  Pain Management Interventions: medication (see MAR)  Intervention: Prevent or Manage Pain  Recent Flowsheet Documentation  Taken 7/9/2025 0900 by Ayaka Valles, RN  Medication Review/Management: medications reviewed   Goal Outcome Evaluation:    Pt had been rating his pain 8/10.  Gave schedule medications along with prn oral Dilaudid 2 mg. Pt is talkative but speech is slurry and doesn't seem to make sense.  Good appetite.  Voided in the urinal.

## 2025-07-09 NOTE — PROGRESS NOTES
PALLIATIVE CARE PROGRESS NOTE  [facility]     Patient Name: Isai Leong  Date of Admission: 6/29/2025   Today the patient was seen for: Follow up patient/family support     Recommendations & Counseling       GOALS OF CARE:   Life-prolonging without limits   Palliative care was not consulted to address goals of care, Isai states in passing that he wishes to follow up with oncology and palliative care outpatient for treatment planning and symptom management.    ADVANCE CARE PLANNING:  No health care directive on file. Per system policy, Surrogate Decision-makers for Patients With Diminished Decision-making Capacity offers guidance on possible decision-makers. In absence of HCD, next of kin will serve as surrogate decision maker.  Spouse Luiz Leong is next of kin.  There is no POLST form on file, defer to patient and/or next of kin for decisions   Code status: Full Code    MEDICAL MANAGEMENT:   #Pain,acute on chronic.  Cancer mediated abdominal pain secondary to large liver mass and ascending colon mass with  multiple lesions.  24 hour OME calculation: 15 mg Methadone PO, 4 mg hydromorphone PO = 130.5 mg OME  Patient reports feelings of lethargy, decreased mental sharpness.  Inquiring about dose reduction of Methadone.   QTc: 473  Recent Labs   Lab Test 07/07/25  0648   PROTTOTAL 6.9   ALBUMIN 3.0*   BILITOTAL 0.3   ALKPHOS 328*   *   ALT 24      Opioids: Hydromorphone 2-4 mg PO q 4 hours prn breakthrough pain, Hydromorphone 0.3 mg IV q 2 hours prn breakthrough pain if unable to take PO  Methadone 5 mg q 12 hours (decreased)  Acetaminophen (Tylenol), Scheduled  NSAIDs:  Avoid due to GIB  Muscle Relaxers:Methocarbamol (Robaxin) per primary team 1000 mg q 6 hours  Anti-depressants:  Venlafaxine (Effexor) per primary team. 37.5 mg daily, daily limit of 150 mg due to hepatic impairment  Anti-convulsants:  Gabapentin (Neurontin) 100 mg TID, do not exceed 300 mg daily due to hepatic  impairment  Other medicines for pain: Decadron 2 mg q 6 hours, hydroxyzine 10 mg TID PRN  Topicals: lidocaine patch to abdomen q 24 hours.  12 hour period with patch followed by 12 hour period without patch to avoid systemic toxicity  Heat, Ice, and Meditation  Bowel Regimen: Miralax 1 packet daily, Senna-Docusate 1-2 tablet two times daily prn - consider scheduling Senna-docusate if patient is not at baseline bowel frequency  Palliative care referral for ongoing pain management     #Nausea, disease processes - malignancy  -Pharmacologic management   Ondansetron (Zofran)   Prochlorperazine (COMPAZINE)   May see benefit from Decadron initiation  -Nonpharmacologic management  Small, frequent intake  Assess and avoid aggravating factors  Accupressure (C-band)  Aromatherapy, alcohol swabs known to be effective    PSYCHOSOCIAL/SPIRITUAL:  Family : spouse, extended family  [place] community: No Worship     Palliative Care will continue to follow. Thank you for the consult and allowing us to aid in the care of Isai Leong.    These recommendations have been discussed with primary team, nursing staff.    GIO North CNP  [facility], Palliative Care  Securely message with the [employer] Web Console (learn more here) or  Text page via [facility] Paging/Directory        Assessment          Isai Leong is a 69 year old male with a past medical history of depression, hypertension, headaches and chronic right sided abdominal pain who presented on 6/29 with abdominal pain and palpable abdominal mass.  Patient also reported ongoing nausea, poor appetite and 35 pound weight loss in the past 3 months.      Patient proceeded to have work up including CT imaging which revealed very large liver mass and multiple lesions along ascending colon with additional mass.  He underwent biopsy on 6/30 and results indicated primary colon cancer with liver mets.     Patient has continued to have significant abdominal pain which has inhibited  his ability to mobilize out of bed or use the bathroom.         Today, the patient was seen for:  Symptom management  Patient support      Interval History:     Multidisciplinary collaboration:  Reported lethargy, decreased mental sharpness.  Elevated potassium today.  Not ready for discharge.    Notable medications:  Methadone, gabapentin, dexamethasone, methocarbamol, tylenol     Patient/family narrative  Met with Isai at the bedside, he is alert and pleasant.  Reviewed reports of lethargy and Isai was able to describe an episode of lightheadedness and not feeling as sharp.  Wondered if the medications were contributing to this.  Discussed reduction of the dose and he was amenable to that.  He reviewed symptom of fatigue and weakness.  Discussed malignancy related fatigue and the management of this with continued movement.  He stated that he would get up out of bed more as he is able.   Made plan for daily follow up to track symptoms.      Review of Systems:     Besides above, ROS was reviewed and is unremarkable        Physical Exam:   Temp:  [97.9  F (36.6  C)-98.1  F (36.7  C)] 98  F (36.7  C)  Pulse:  [74-88] 77  Resp:  [14-16] 14  BP: (133-160)/(78-89) 133/84  SpO2:  [94 %-98 %] 94 %  125 lbs 7 oz    Physical Exam  Vitals reviewed.   Cardiovascular:      Pulses: Normal pulses.   Pulmonary:      Effort: Pulmonary effort is normal.   Skin:     General: Skin is warm and dry.   Neurological:      General: No focal deficit present.      Mental Status: He is alert.   Psychiatric:         Mood and Affect: Mood normal.         Behavior: Behavior normal.         Thought Content: Thought content normal.         Judgment: Judgment normal.           Data Reviewed:     Recent Results (from the past 24 hours)   Platelet count   Result Value Ref Range    Platelet Count 457 (H) 150 - 450 10e3/uL    MCV 56 (L) 78 - 100 fL   Comprehensive metabolic panel   Result Value Ref Range    Sodium 134 (L) 135 - 145 mmol/L     Potassium 5.5 (H) 3.4 - 5.3 mmol/L    Carbon Dioxide (CO2) 33 (H) 22 - 29 mmol/L    Anion Gap 8 7 - 15 mmol/L    Urea Nitrogen 37.1 (H) 8.0 - 23.0 mg/dL    Creatinine 0.81 0.67 - 1.17 mg/dL    GFR Estimate >90 >60 mL/min/1.73m2    Calcium 9.6 8.8 - 10.4 mg/dL    Chloride 93 (L) 98 - 107 mmol/L    Glucose 120 (H) 70 - 99 mg/dL    Alkaline Phosphatase 391 (H) 40 - 150 U/L    AST 98 (H) 0 - 45 U/L    ALT 36 0 - 70 U/L    Protein Total 6.6 6.4 - 8.3 g/dL    Albumin 3.1 (L) 3.5 - 5.2 g/dL    Bilirubin Total <0.2 <=1.2 mg/dL         Medical Decision Making       Please see A&P for additional details of medical decision making.  MANAGEMENT DISCUSSED with the following over the past 24 hours: hospitalist, nursing staff   NOTE(S)/MEDICAL RECORDS REVIEWED over the past 24 hours: progress notes, nursing assessment flowsheets, eMAR  Tests REVIEWED in the past 24 hours:  - See lab/imaging results included in the data section of the note  Medical complexity over the past 24 hours:  - Prescription DRUG MANAGEMENT performed

## 2025-07-09 NOTE — PROGRESS NOTES
Care Management Follow Up    Length of Stay (days): 9    Expected Discharge Date: 07/10/2025    Anticipated Discharge Plan:   home     Transportation: Confirmed Family/friend    PT Recommendations:    OT Recommendations:        Barriers to Discharge: medical stability    Prior Living Situation: apartment (elevator access) with alone    Discussed  Partnership in Safe Discharge Planning  document with patient/family: No     Handoff Completed: No, handoff not indicated or clinically appropriate    Patient/Spokesperson Updated: Yes. Who? Patient     Additional Information:  10:14 AM  See initial care management assessment note on 7/6 for additional information. Patient reported inability to pay current rent, working with a rep from [employer] a housing support agency through [facility]. Per pt Dom [phone] is the rep he has been communicating with but has not hear anything back on the agencies ability to assist him with rent. SABRA called Dom, left general voice mail requesting call back as Dom' voice mail did not confirm it was Dom or indicate secure voice mail.     10:32 AM  Received call back from Warren Sierra, who confirms he works with [employer]. Warren reports he spoke with patient yesterday and has reached out to pt's  to get necessary documentation related to pt's lease agreement. Warren reports he needs 30 days of bank statements and any proof of income from patient emailed to [email or web address]. Attempted to meet with patient to discuss. Patient on the phone and asked SW to come back later.     11:15 AM  Attempted to meet with patient. Busy with another provider.     11:44 AM  Met with patient; provided information as documented above. Patient had access to his bank statements on his phone. SABRA assisted patient in emailing these documents to Warren. Informed patient that Warren needs copies of SNAP and other sources of income. Patient reports having these documents at home. Discussed that patient can take pictures  of these documents to send them to Warren.     Patient confirms plan is to return home at discharge.     Next steps: follow for discharge needs      VISHAL Webster  Acute Care Management  [facility]  For further questions/concerns you can reach us at Vocera Web Console

## 2025-07-09 NOTE — PLAN OF CARE
Goal Outcome Evaluation: Patient is A/O, forgetful at times, VSS on RA. Patients appetite good, had several snacks and ate 100%. Voiding without difficulty, Pt stated they had x1 BM and no CO of constipation, refused Miralax. K protocol, recheck AM. Calls appropriately.          Plan of Care Reviewed With: patient        Problem: Pain Acute  Goal: Optimal Pain Control and Function  Intervention: Prevent or Manage Pain  Recent Flowsheet Documentation  Taken 7/8/2025 2352 by Veena Desai RN  Sleep/Rest Enhancement: awakenings minimized  Bowel Elimination Promotion: adequate fluid intake promoted  Medication Review/Management: medications reviewed  Taken 7/8/2025 1959 by Veena Desai RN  Bowel Elimination Promotion: adequate fluid intake promoted  Medication Review/Management: medications reviewed     Problem: Pain Chronic (Persistent)  Goal: Optimal Pain Control and Function  Intervention: Manage Persistent Pain  Recent Flowsheet Documentation  Taken 7/8/2025 2352 by Veena Desai RN  Sleep/Rest Enhancement: awakenings minimized  Bowel Elimination Promotion: adequate fluid intake promoted  Medication Review/Management: medications reviewed  Taken 7/8/2025 1959 by Veena Desai RN  Bowel Elimination Promotion: adequate fluid intake promoted  Medication Review/Management: medications reviewed     Patients pain tolerated with currant pain plan.    Problem: Delirium  Goal: Improved Sleep  Outcome: Progressing  Intervention: Promote Sleep  Recent Flowsheet Documentation  Taken 7/8/2025 2352 by Veena Desai RN  Sleep/Rest Enhancement: awakenings minimized     Cares clustered and patient slept most of the night.     Problem: Adult Inpatient Plan of Care  Goal: Absence of Hospital-Acquired Illness or Injury  Intervention: Identify and Manage Fall Risk  Recent Flowsheet Documentation  Taken 7/8/2025 2352 by Veena Desai RN  Safety Promotion/Fall Prevention:   safety round/check completed   nonskid  shoes/slippers when out of bed   activity supervised   assistive device/personal items within reach   increased rounding and observation  Taken 7/8/2025 1959 by Veena Desai, RN  Safety Promotion/Fall Prevention:   safety round/check completed   nonskid shoes/slippers when out of bed   activity supervised   assistive device/personal items within reach   increased rounding and observation     Patient is up with SBA, due to unsteady gait, bed alarm on for safety and educated on the importance to call for help up to the Br.     Veena Desai, RN

## 2025-07-10 ENCOUNTER — APPOINTMENT (OUTPATIENT)
Dept: PHYSICAL THERAPY | Facility: HOSPITAL | Age: 70
DRG: 435 | End: 2025-07-10
Payer: MEDICARE

## 2025-07-10 VITALS
BODY MASS INDEX: 17.96 KG/M2 | SYSTOLIC BLOOD PRESSURE: 163 MMHG | HEIGHT: 70 IN | DIASTOLIC BLOOD PRESSURE: 85 MMHG | RESPIRATION RATE: 16 BRPM | OXYGEN SATURATION: 97 % | TEMPERATURE: 97.5 F | WEIGHT: 125.44 LBS | HEART RATE: 80 BPM

## 2025-07-10 LAB
POTASSIUM SERPL-SCNC: 4.9 MMOL/L (ref 3.4–5.3)
POTASSIUM SERPL-SCNC: 5.5 MMOL/L (ref 3.4–5.3)

## 2025-07-10 PROCEDURE — 84132 ASSAY OF SERUM POTASSIUM: CPT

## 2025-07-10 PROCEDURE — 250N000013 HC RX MED GY IP 250 OP 250 PS 637

## 2025-07-10 PROCEDURE — 97530 THERAPEUTIC ACTIVITIES: CPT | Mod: GP | Performed by: PHYSICAL THERAPIST

## 2025-07-10 PROCEDURE — 120N000001 HC R&B MED SURG/OB

## 2025-07-10 PROCEDURE — 99232 SBSQ HOSP IP/OBS MODERATE 35: CPT | Performed by: NURSE PRACTITIONER

## 2025-07-10 PROCEDURE — 36415 COLL VENOUS BLD VENIPUNCTURE: CPT

## 2025-07-10 PROCEDURE — 97116 GAIT TRAINING THERAPY: CPT | Mod: GP | Performed by: PHYSICAL THERAPIST

## 2025-07-10 PROCEDURE — 250N000012 HC RX MED GY IP 250 OP 636 PS 637: Performed by: NURSE PRACTITIONER

## 2025-07-10 PROCEDURE — 250N000013 HC RX MED GY IP 250 OP 250 PS 637: Performed by: NURSE PRACTITIONER

## 2025-07-10 PROCEDURE — 250N000011 HC RX IP 250 OP 636

## 2025-07-10 PROCEDURE — 84132 ASSAY OF SERUM POTASSIUM: CPT | Performed by: FAMILY MEDICINE

## 2025-07-10 PROCEDURE — 36415 COLL VENOUS BLD VENIPUNCTURE: CPT | Performed by: FAMILY MEDICINE

## 2025-07-10 PROCEDURE — 99232 SBSQ HOSP IP/OBS MODERATE 35: CPT | Mod: GC

## 2025-07-10 RX ADMIN — HYDROMORPHONE HYDROCHLORIDE 2 MG: 2 TABLET ORAL at 08:59

## 2025-07-10 RX ADMIN — ACETAMINOPHEN 500 MG: 500 TABLET ORAL at 06:11

## 2025-07-10 RX ADMIN — AMLODIPINE BESYLATE 5 MG: 5 TABLET ORAL at 22:00

## 2025-07-10 RX ADMIN — GABAPENTIN 100 MG: 100 CAPSULE ORAL at 22:00

## 2025-07-10 RX ADMIN — ACETAMINOPHEN 500 MG: 500 TABLET ORAL at 12:28

## 2025-07-10 RX ADMIN — DEXAMETHASONE 2 MG: 2 TABLET ORAL at 06:10

## 2025-07-10 RX ADMIN — VENLAFAXINE HYDROCHLORIDE 37.5 MG: 37.5 CAPSULE, EXTENDED RELEASE ORAL at 08:33

## 2025-07-10 RX ADMIN — ACETAMINOPHEN 500 MG: 500 TABLET ORAL at 18:54

## 2025-07-10 RX ADMIN — DEXAMETHASONE 2 MG: 2 TABLET ORAL at 18:59

## 2025-07-10 RX ADMIN — ACETAMINOPHEN 500 MG: 500 TABLET ORAL at 00:02

## 2025-07-10 RX ADMIN — POLYETHYLENE GLYCOL 3350 17 G: 17 POWDER, FOR SOLUTION ORAL at 21:59

## 2025-07-10 RX ADMIN — DEXAMETHASONE 2 MG: 2 TABLET ORAL at 00:02

## 2025-07-10 RX ADMIN — METHOCARBAMOL 1000 MG: 500 TABLET ORAL at 06:10

## 2025-07-10 RX ADMIN — AMLODIPINE BESYLATE 5 MG: 5 TABLET ORAL at 08:33

## 2025-07-10 RX ADMIN — METHADONE HYDROCHLORIDE 5 MG: 5 SOLUTION ORAL at 21:59

## 2025-07-10 RX ADMIN — ENOXAPARIN SODIUM 40 MG: 40 INJECTION SUBCUTANEOUS at 08:33

## 2025-07-10 RX ADMIN — GABAPENTIN 100 MG: 100 CAPSULE ORAL at 14:38

## 2025-07-10 RX ADMIN — METHOCARBAMOL 1000 MG: 500 TABLET ORAL at 00:02

## 2025-07-10 RX ADMIN — LIDOCAINE 2 PATCH: 4 PATCH TOPICAL at 11:03

## 2025-07-10 RX ADMIN — METHADONE HYDROCHLORIDE 5 MG: 5 SOLUTION ORAL at 11:03

## 2025-07-10 RX ADMIN — METHOCARBAMOL 1000 MG: 500 TABLET ORAL at 12:28

## 2025-07-10 RX ADMIN — HYDROMORPHONE HYDROCHLORIDE 2 MG: 2 TABLET ORAL at 14:38

## 2025-07-10 RX ADMIN — GABAPENTIN 100 MG: 100 CAPSULE ORAL at 08:33

## 2025-07-10 RX ADMIN — HYDROMORPHONE HYDROCHLORIDE 2 MG: 2 TABLET ORAL at 04:22

## 2025-07-10 RX ADMIN — METHOCARBAMOL 1000 MG: 500 TABLET ORAL at 18:54

## 2025-07-10 RX ADMIN — POLYETHYLENE GLYCOL 3350 17 G: 17 POWDER, FOR SOLUTION ORAL at 08:33

## 2025-07-10 RX ADMIN — DEXAMETHASONE 2 MG: 2 TABLET ORAL at 12:28

## 2025-07-10 RX ADMIN — HYDROMORPHONE HYDROCHLORIDE 2 MG: 2 TABLET ORAL at 22:12

## 2025-07-10 ASSESSMENT — ACTIVITIES OF DAILY LIVING (ADL)
ADLS_ACUITY_SCORE: 63

## 2025-07-10 NOTE — PLAN OF CARE
Goal Outcome Evaluation:  Patient is alert and oriented and able to make needs known. Inquiring more about the cancer and looking forward to meeting with oncology to get these questions addressed.  Pain in abdomen consistently remains an 8. On scheduled Methadon, gabapentin, lidocaine patches, tylenol, dexamethasone and robaxin. Also got a prn dose of dilaudid PO at 0900. Palliative following, no changes today.  Appetite fair. On regular diet, encouraging PO fluid intake.  IV Sl'd  K 5.5 this am. MD ordered for recheck around 11 which was 4.9. Next recheck in am.  PT following and will see this afternoon.  Up with SBA and walker to the bathroom.  Could possibly discharge tomorrow. TCU vs Home with HC.

## 2025-07-10 NOTE — PROGRESS NOTES
CLINICAL NUTRITION SERVICES     Nutrition Prescription    RECOMMENDATIONS FOR MDs/PROVIDERS TO ORDER:      Recommendations already ordered by Registered Dietitian (RD):  Pt prefers vanilla Ensure with meals rather than for snack - he would like 2 with each meal.  Pt may order double portions   Brought pt a [employer] 1.4 to try    Future/Additional Recommendations:  Will continue to monitor      Allergy: chocolate    Diet: Regular  Supplement: Vanilla Ensure between meals.  Intake: % of meals    INTERVENTIONS:  Implementation:  Medical food supplement therapy    Follow up/Monitoring:  Per policy

## 2025-07-10 NOTE — PROGRESS NOTES
[facility]    Medicine Progress Note - Hospitalist Service    Date of Admission:  6/29/2025    Assessment & Plan      Isai Leong is a 69 year old male admitted on 6/29/2025. He has PMH of depression, hypertension, headaches and is admitted for chronic right sided abdominal pain found to have a large mass in his colon with likely metastatic liver disease. Remains hospitalized for pain control - palliative following. Potassium elevated on 7/9 and persistently borderline. Anticipate discharge home tomorrow, pending PT recs.     Lobulated Mass of Ascending Colon, with Ileum and Lymph Node Involvement  Metastatic Liver Lesions  Presumed cancer pain  Chronic right abdominal pain with masses as above, CT imaging with very large liver and multiple lesions along with the ascending colon mass. Physical exam consistent with large nodular liver, likely primary cancer of the colon with metastasis to the liver. Weight loss, poor appetite, also suggestive of underlying cancer. S/p liver bx w/IR on 6/30. Some improvement in abdominal pain w/decadron and gabapentin overnight. Palliative following - given increasing total daily MME, CMP wnl, and EKG w/normal Qtc, methadone for pain control initiated 7/8. Mentation improved w/reducing frequency of methadone, but pain persistently 8/10. Patient does appear comfortable at rest and RN reports he moves fairly well. PT recommending TCU, but I wonder if this was related to his drowsiness.   -Palliative consult, recs appreciated     -Pain regimen:   - Gabapentin 100mg TID, do not exceed 300mg d d/t hepatic impairment    - Decadron 2mg q6h    - Methadone 5mg q12h   - Dilaudid 1-2mg PO q3h PRN, 0.3mg IV q3h PRN   - Hydroxyzine 10mg TID PRN     - Lidocaine patches q24h    -Tylenol 500mg q6h, avoid additional tylenol given liver disease   -Robaxin 1000mg q6h    -Venlafaxine 37.5mg d - do not exceed 150mg/d d/t hepatic impairment    -Avoid NSAIDs given GI bleed  -  Bowel regimen:   -Miralax daily    -Senna BID PRN   - Referral to palliative clinic on discharge   -Will work with pharmacy to re-time pain medications for ease of management on discharge.   -PT consult, recs appreciated -- recommending TCU.   -Liver bx showing colon cancer w/mets, genotype pending  -Add-on CEA for outpatient oncology follow-up  -Hold on CT chest per oncology curbside   -Referral for [facility] on discharge   -CM/SW consult, recs appreciated -- TCU vs home w/home nursing, PT/OT?    Mild hyperkalemia  Mild hyponatremia   K 5.5, Na 134 on 7/9. Repeat wnl, though ULN. Cr and GFR wnl. Unclear etiology - not on offending medications. Possibly incidental vs related to PO intake.   -Recheck K  -Mild hyperkalemia protocol   -Correct if K > 6    R inguinal hernia  Enlarged, fluid palpated, but reducible in setting of colon cancer w/mets discussed above. Not noted on CT AP on admission.   -Continue to monitor   -Outpatient surgical follow-up if remains benign  -Pain management per above    Microcytic anemia - resolved  MCV 50, Hg 7 on admission, s/p total 2u pRBC. Remains hemodynamically stable and hgb between 9-10. Ferritin 100. PBS w/slight target cells and sickle cells - unclear significance w/lack of personal/family hx of this per chart rvw.   - CBC if becomes hemodynamically unstable     Lactic Acidosis  Likely Type A lactic acidosis, improved with fluid resuscitation and will be getting RBC transfusions.   - No further laboratory testing required    Itching   - Hydroxyzine PRN  - Zyrtec PRN for itching    Housing concerns  Patient requested to speak w/SW to discuss housing. Remote hx of homelessness. He is currently living in an apartment and is concerned about losing this. Did not explicate why with me.   -CM/SW consult, recs appreciated           Diet: Regular Diet Adult  Snacks/Supplements Adult: Ensure Plus High Protein; Between Meals    DVT Prophylaxis: VTE Prophylaxis contraindicated due to  acute anemia   Her Catheter: Not present  Lines: None     Cardiac Monitoring: None  Code Status: Full Code      Clinically Significant Risk Factors        # Hyperkalemia: Highest K = 5.5 mmol/L in last 2 days, will monitor as appropriate  # Hyponatremia: Lowest Na = 134 mmol/L in last 2 days, will monitor as appropriate  # Hypochloremia: Lowest Cl = 93 mmol/L in last 2 days, will monitor as appropriate      # Hypoalbuminemia: Lowest albumin = 2.8 g/dL at 6/29/2025 11:50 PM, will monitor as appropriate     # Hypertension: Noted on problem list             # Severe Malnutrition: based on nutrition assessment and treatment provided per dietitian's recommendations.    # Financial/Environmental Concerns: unable to afford rent/mortgage;other (see comments) (Patient at risk for eviction a he is3 months behind in his rent)         Social Drivers of Health    Tobacco Use: High Risk (5/11/2021)    Received from [employer]    Patient History     Smoking Tobacco Use: Every Day     Smokeless Tobacco Use: Unknown   Interpersonal Safety: High Risk (7/1/2025)    Interpersonal Safety     Do you feel physically and emotionally safe where you currently live?: No     Within the past 12 months, have you been hit, slapped, kicked or otherwise physically hurt by someone?: No     Within the past 12 months, have you been humiliated or emotionally abused in other ways by your partner or ex-partner?: No          Disposition Plan   Medically Ready for Discharge: Anticipated Tomorrow           The patient's care was discussed with the Attending Physician, Dr. Negron.    Merlin Guevara MD  Hospitalist Service  [facility]  Securely message with [employer] (more info)  Text page via [employer] Paging/Directory   ______________________________________________________________________    Interval History   NAEO. Abdominal pain still 8/10. Was very lightheaded yesterday during walk w/PT. Reports brain fogginess gone today.  "Nervous to go home because \"there are no professionals around to help and advise me.\" Has a nephew who can help and come by, but not always available. Unsure how to manage his medications - RN also worries about this because he is getting something around the clock.     Physical Exam   Vital Signs: Temp: 97.5  F (36.4  C) Temp src: Oral BP: (!) 147/88 (RN Notified) Pulse: 83   Resp: 16 SpO2: 100 % O2 Device: None (Room air)    Weight: 125 lbs 7 oz    Physical Exam  Constitutional:       General: He is not in acute distress.     Appearance: He is not toxic-appearing.      Comments: Appears comfortable. Chatty, but redirectable.    HENT:      Head: Normocephalic and atraumatic.   Pulmonary:      Effort: Pulmonary effort is normal.   Abdominal:      General: There is distension.      Tenderness: There is abdominal tenderness. There is guarding and rebound.   Genitourinary:     Comments: Enlarged, but reducible inguinal hernia R groin. Fluid filled.   Musculoskeletal:      Right lower leg: No edema.      Left lower leg: No edema.   Neurological:      General: No focal deficit present.      Mental Status: He is alert and oriented to person, place, and time.      Comments: Mentation intact. Thought process more normal today. Mood appropriate. Insight fair.    Psychiatric:         Mood and Affect: Mood normal.         Behavior: Behavior normal.           Medical Decision Making       ------------------ MEDICAL DECISION MAKING ------------------------------------------------------------------------------------------------------      Data   ------------------------- PAST 24 HR DATA REVIEWED -----------------------------------------------  "

## 2025-07-10 NOTE — PROGRESS NOTES
PALLIATIVE CARE PROGRESS NOTE  [facility]     Patient Name: Isai Leong  Date of Admission: 6/29/2025   Today the patient was seen for: Follow up patient/family support     Recommendations & Counseling       GOALS OF CARE:   Life-prolonging without limits   Palliative care was not consulted to address goals of care, Isai states in passing that he wishes to follow up with oncology and palliative care outpatient for treatment planning and symptom management.  Palliative care clinic referral for follow up symptom management - referral placed in discharge navigator    ADVANCE CARE PLANNING:  No health care directive on file. Per system policy, Surrogate Decision-makers for Patients With Diminished Decision-making Capacity offers guidance on possible decision-makers. In absence of HCD, next of kin will serve as surrogate decision maker.  Spouse Luiz Leong is next of kin.  There is no POLST form on file, defer to patient and/or next of kin for decisions   Code status: Full Code    MEDICAL MANAGEMENT:   #Pain,acute on chronic.  Cancer mediated abdominal pain secondary to large liver mass and ascending colon mass with  multiple lesions.  24 hour opioid use: 10 mg Methadone PO, 4 mg hydromorphone PO  Patient reports feelings of lethargy, decreased mental sharpness.  Inquiring about dose reduction of Methadone.   QTc: 473  Recent Labs   Lab Test 07/07/25  0648   PROTTOTAL 6.9   ALBUMIN 3.0*   BILITOTAL 0.3   ALKPHOS 328*   *   ALT 24      Opioids: Hydromorphone 2-4 mg PO q 4 hours prn breakthrough pain, Hydromorphone 0.3 mg IV q 2 hours prn breakthrough pain if unable to take PO  Methadone 5 mg q 12 hours  Acetaminophen (Tylenol), Scheduled  NSAIDs:  Avoid due to GIB  Muscle Relaxers:Methocarbamol (Robaxin) per primary team 1000 mg q 6 hours  Anti-depressants:  Venlafaxine (Effexor) per primary team. 37.5 mg daily, daily limit of 150 mg due to hepatic impairment  Anti-convulsants:   Gabapentin (Neurontin) 100 mg TID, do not exceed 300 mg daily due to hepatic impairment  Other medicines for pain: Decadron 2 mg q 6 hours, hydroxyzine 10 mg TID PRN  Topicals: lidocaine patch to abdomen q 24 hours.  12 hour period with patch followed by 12 hour period without patch to avoid systemic toxicity  Heat, Ice, and Meditation  Bowel Regimen: Miralax 1 packet daily, Senna-Docusate 1-2 tablet two times daily prn - consider scheduling Senna-docusate if patient is not at baseline bowel frequency  Palliative care referral for ongoing pain management     #Nausea, disease processes - malignancy  -Pharmacologic management   Ondansetron (Zofran)   Prochlorperazine (COMPAZINE)   May see benefit from Decadron initiation  -Nonpharmacologic management  Small, frequent intake  Assess and avoid aggravating factors  Accupressure (C-band)  Aromatherapy, alcohol swabs known to be effective    PSYCHOSOCIAL/SPIRITUAL:  Family : spouse, extended family  [place] community: No Religious     Palliative Care will continue to follow. Thank you for the consult and allowing us to aid in the care of Isai Leong.    These recommendations have been discussed with primary team, nursing staff.    GIO North CNP  [facility], Palliative Care  Securely message with the [employer] Web Console (learn more here) or  Text page via [facility] Paging/Directory        Assessment          Isai Leong is a 69 year old male with a past medical history of depression, hypertension, headaches and chronic right sided abdominal pain who presented on 6/29 with abdominal pain and palpable abdominal mass.  Patient also reported ongoing nausea, poor appetite and 35 pound weight loss in the past 3 months.      Patient proceeded to have work up including CT imaging which revealed very large liver mass and multiple lesions along ascending colon with additional mass.  He underwent biopsy on 6/30 and results indicated primary colon cancer with liver mets.      Patient has continued to have significant abdominal pain which has inhibited his ability to mobilize out of bed or use the bathroom.         Today, the patient was seen for:  Symptom management  Patient support      Interval History:     Multidisciplinary collaboration:  Reported lethargy, decreased mental sharpness.  Elevated potassium today.  Not ready for discharge.    Notable medications:  Methadone, gabapentin, dexamethasone, methocarbamol, tylenol     Patient/family narrative  Met with Isai at the bedside, ambulating to the bathroom.  Denies significant pain, states it is stable and he is able to mobilize.  Reviewed plan for outpatient follow up and ongoing symptom management assistance from palliative care team.  He denied questions or concerns.    Review of Systems:     Besides above, ROS was reviewed and is unremarkable        Physical Exam:   Temp:  [97.5  F (36.4  C)-98.6  F (37  C)] 97.5  F (36.4  C)  Pulse:  [70-83] 83  Resp:  [16] 16  BP: (127-147)/(82-88) 147/88  SpO2:  [91 %-100 %] 100 %  125 lbs 7 oz    Physical Exam  Vitals reviewed.   Cardiovascular:      Pulses: Normal pulses.   Pulmonary:      Effort: Pulmonary effort is normal.   Skin:     General: Skin is warm and dry.   Neurological:      General: No focal deficit present.      Mental Status: He is alert.   Psychiatric:         Mood and Affect: Mood normal.           Data Reviewed:     Recent Results (from the past 24 hours)   Potassium   Result Value Ref Range    Potassium 5.5 (H) 3.4 - 5.3 mmol/L         Medical Decision Making       45 MINUTES SPENT BY ME on the date of service doing chart review, history, exam, documentation & further activities per the note.

## 2025-07-10 NOTE — PLAN OF CARE
Goal Outcome Evaluation:         Problem: Adult Inpatient Plan of Care  Goal: Optimal Comfort and Wellbeing  Outcome: Progressing  Intervention: Provide Person-Centered Care  Recent Flowsheet Documentation  Taken 7/10/2025 0002 by Talisha Brown RN  Trust Relationship/Rapport:   care explained   emotional support provided  Taken 7/9/2025 2100 by Talisha Brown RN  Trust Relationship/Rapport:   care explained   emotional support provided     Problem: Delirium  Goal: Improved Attention and Thought Clarity  Outcome: Progressing  Intervention: Maximize Cognitive Function  Recent Flowsheet Documentation  Taken 7/10/2025 0002 by Talisha Brown RN  Reorientation Measures: clock in view  Taken 7/9/2025 2100 by Talisha Brown RN  Reorientation Measures: clock in view     Problem: Delirium  Goal: Improved Sleep  Outcome: Progressing         Pt AxO x4, VSS on RA, pain 8/10 SBA in room w/walker, PRN dilaudid given x1 overnight, slept between cares no significant events

## 2025-07-11 ENCOUNTER — APPOINTMENT (OUTPATIENT)
Dept: PHYSICAL THERAPY | Facility: HOSPITAL | Age: 70
DRG: 435 | End: 2025-07-11
Payer: MEDICARE

## 2025-07-11 LAB
HOLD SPECIMEN: NORMAL
POTASSIUM SERPL-SCNC: 5.3 MMOL/L (ref 3.4–5.3)

## 2025-07-11 PROCEDURE — 250N000013 HC RX MED GY IP 250 OP 250 PS 637

## 2025-07-11 PROCEDURE — 36415 COLL VENOUS BLD VENIPUNCTURE: CPT | Performed by: FAMILY MEDICINE

## 2025-07-11 PROCEDURE — 97110 THERAPEUTIC EXERCISES: CPT | Mod: GP

## 2025-07-11 PROCEDURE — 97116 GAIT TRAINING THERAPY: CPT | Mod: GP

## 2025-07-11 PROCEDURE — 120N000001 HC R&B MED SURG/OB

## 2025-07-11 PROCEDURE — 84132 ASSAY OF SERUM POTASSIUM: CPT | Performed by: FAMILY MEDICINE

## 2025-07-11 PROCEDURE — 250N000013 HC RX MED GY IP 250 OP 250 PS 637: Performed by: NURSE PRACTITIONER

## 2025-07-11 PROCEDURE — 99232 SBSQ HOSP IP/OBS MODERATE 35: CPT | Performed by: NURSE PRACTITIONER

## 2025-07-11 PROCEDURE — 250N000012 HC RX MED GY IP 250 OP 636 PS 637: Performed by: NURSE PRACTITIONER

## 2025-07-11 PROCEDURE — 250N000011 HC RX IP 250 OP 636

## 2025-07-11 PROCEDURE — 99232 SBSQ HOSP IP/OBS MODERATE 35: CPT | Mod: GC

## 2025-07-11 RX ORDER — POLYETHYLENE GLYCOL 3350 17 G/17G
17 POWDER, FOR SOLUTION ORAL DAILY
Qty: 510 G | Refills: 0 | Status: SHIPPED | OUTPATIENT
Start: 2025-07-11

## 2025-07-11 RX ORDER — GABAPENTIN 100 MG/1
100 CAPSULE ORAL 3 TIMES DAILY
Qty: 90 CAPSULE | Refills: 0 | Status: SHIPPED | OUTPATIENT
Start: 2025-07-11

## 2025-07-11 RX ORDER — ACETAMINOPHEN 500 MG
500 TABLET ORAL EVERY 6 HOURS
Qty: 60 TABLET | Refills: 0 | Status: SHIPPED | OUTPATIENT
Start: 2025-07-11

## 2025-07-11 RX ORDER — AMLODIPINE BESYLATE 5 MG/1
5 TABLET ORAL 2 TIMES DAILY
Qty: 28 TABLET | Refills: 0 | Status: SHIPPED | OUTPATIENT
Start: 2025-07-11 | End: 2025-07-25

## 2025-07-11 RX ORDER — AMOXICILLIN 250 MG
1 CAPSULE ORAL 2 TIMES DAILY PRN
Qty: 60 TABLET | Refills: 0 | Status: SHIPPED | OUTPATIENT
Start: 2025-07-11

## 2025-07-11 RX ORDER — METHADONE HYDROCHLORIDE 5 MG/5ML
5 SOLUTION ORAL EVERY 12 HOURS
Qty: 200 ML | Refills: 0 | Status: SHIPPED | OUTPATIENT
Start: 2025-07-11 | End: 2025-07-15

## 2025-07-11 RX ORDER — LIDOCAINE 4 G/G
2 PATCH TOPICAL EVERY 24 HOURS
Qty: 28 PATCH | Refills: 0 | Status: SHIPPED | OUTPATIENT
Start: 2025-07-12 | End: 2025-07-26

## 2025-07-11 RX ORDER — HYDROXYZINE HYDROCHLORIDE 10 MG/1
10 TABLET, FILM COATED ORAL 3 TIMES DAILY PRN
Qty: 30 TABLET | Refills: 0 | Status: SHIPPED | OUTPATIENT
Start: 2025-07-11

## 2025-07-11 RX ORDER — VENLAFAXINE HYDROCHLORIDE 37.5 MG/1
37.5 CAPSULE, EXTENDED RELEASE ORAL
Qty: 30 CAPSULE | Refills: 0 | Status: SHIPPED | OUTPATIENT
Start: 2025-07-12

## 2025-07-11 RX ORDER — ONDANSETRON 4 MG/1
4 TABLET, ORALLY DISINTEGRATING ORAL EVERY 6 HOURS PRN
Qty: 30 TABLET | Refills: 0 | Status: SHIPPED | OUTPATIENT
Start: 2025-07-11

## 2025-07-11 RX ORDER — METHOCARBAMOL 1000 MG/1
1000 TABLET, FILM COATED ORAL EVERY 6 HOURS
Qty: 56 TABLET | Refills: 0 | Status: SHIPPED | OUTPATIENT
Start: 2025-07-11

## 2025-07-11 RX ORDER — DEXAMETHASONE 2 MG/1
2 TABLET ORAL EVERY 6 HOURS
Qty: 90 TABLET | Refills: 0 | Status: SHIPPED | OUTPATIENT
Start: 2025-07-11

## 2025-07-11 RX ORDER — HYDROMORPHONE HYDROCHLORIDE 2 MG/1
1-2 TABLET ORAL
Qty: 36 TABLET | Refills: 0 | Status: SHIPPED | OUTPATIENT
Start: 2025-07-11 | End: 2025-07-15

## 2025-07-11 RX ADMIN — GABAPENTIN 100 MG: 100 CAPSULE ORAL at 21:24

## 2025-07-11 RX ADMIN — METHOCARBAMOL 1000 MG: 500 TABLET ORAL at 00:15

## 2025-07-11 RX ADMIN — GABAPENTIN 100 MG: 100 CAPSULE ORAL at 14:08

## 2025-07-11 RX ADMIN — METHADONE HYDROCHLORIDE 5 MG: 5 SOLUTION ORAL at 21:24

## 2025-07-11 RX ADMIN — DEXAMETHASONE 2 MG: 2 TABLET ORAL at 17:33

## 2025-07-11 RX ADMIN — AMLODIPINE BESYLATE 5 MG: 5 TABLET ORAL at 21:24

## 2025-07-11 RX ADMIN — ACETAMINOPHEN 500 MG: 500 TABLET ORAL at 14:09

## 2025-07-11 RX ADMIN — LIDOCAINE 2 PATCH: 4 PATCH TOPICAL at 11:30

## 2025-07-11 RX ADMIN — ACETAMINOPHEN 500 MG: 500 TABLET ORAL at 06:34

## 2025-07-11 RX ADMIN — DEXAMETHASONE 2 MG: 2 TABLET ORAL at 06:34

## 2025-07-11 RX ADMIN — POLYETHYLENE GLYCOL 3350 17 G: 17 POWDER, FOR SOLUTION ORAL at 21:25

## 2025-07-11 RX ADMIN — METHADONE HYDROCHLORIDE 5 MG: 5 SOLUTION ORAL at 11:37

## 2025-07-11 RX ADMIN — ENOXAPARIN SODIUM 40 MG: 40 INJECTION SUBCUTANEOUS at 08:32

## 2025-07-11 RX ADMIN — METHOCARBAMOL 1000 MG: 500 TABLET ORAL at 11:30

## 2025-07-11 RX ADMIN — DEXAMETHASONE 2 MG: 2 TABLET ORAL at 00:15

## 2025-07-11 RX ADMIN — ACETAMINOPHEN 500 MG: 500 TABLET ORAL at 00:16

## 2025-07-11 RX ADMIN — VENLAFAXINE HYDROCHLORIDE 37.5 MG: 37.5 CAPSULE, EXTENDED RELEASE ORAL at 08:32

## 2025-07-11 RX ADMIN — HYDROMORPHONE HYDROCHLORIDE 2 MG: 2 TABLET ORAL at 08:39

## 2025-07-11 RX ADMIN — HYDROMORPHONE HYDROCHLORIDE 1 MG: 2 TABLET ORAL at 04:20

## 2025-07-11 RX ADMIN — DEXAMETHASONE 2 MG: 2 TABLET ORAL at 11:30

## 2025-07-11 RX ADMIN — HYDROMORPHONE HYDROCHLORIDE 2 MG: 2 TABLET ORAL at 14:09

## 2025-07-11 RX ADMIN — GABAPENTIN 100 MG: 100 CAPSULE ORAL at 08:33

## 2025-07-11 RX ADMIN — AMLODIPINE BESYLATE 5 MG: 5 TABLET ORAL at 08:33

## 2025-07-11 RX ADMIN — METHOCARBAMOL 1000 MG: 500 TABLET ORAL at 17:33

## 2025-07-11 RX ADMIN — ACETAMINOPHEN 500 MG: 500 TABLET ORAL at 18:42

## 2025-07-11 RX ADMIN — POLYETHYLENE GLYCOL 3350 17 G: 17 POWDER, FOR SOLUTION ORAL at 08:33

## 2025-07-11 RX ADMIN — METHOCARBAMOL 1000 MG: 500 TABLET ORAL at 06:34

## 2025-07-11 RX ADMIN — HYDROMORPHONE HYDROCHLORIDE 2 MG: 2 TABLET ORAL at 18:42

## 2025-07-11 ASSESSMENT — ACTIVITIES OF DAILY LIVING (ADL)
ADLS_ACUITY_SCORE: 63

## 2025-07-11 NOTE — PLAN OF CARE
Shift from 1500 to 2330-    Problem: Pain Chronic (Persistent)  Goal: Optimal Pain Control and Function  Outcome: Progressing  Intervention: Develop Pain Management Plan  Recent Flowsheet Documentation  Taken 7/10/2025 2300 by Emily Singh RN  Pain Management Interventions: emotional support  Taken 7/10/2025 2212 by Emily Singh RN  Pain Management Interventions: medication (see MAR)  Taken 7/10/2025 2200 by Emily Singh RN  Pain Management Interventions: medication (see MAR)  Taken 7/10/2025 1854 by Emily Singh RN  Pain Management Interventions: medication (see MAR)  Intervention: Manage Persistent Pain  Recent Flowsheet Documentation  Taken 7/10/2025 1602 by Emily Singh RN  Bowel Elimination Promotion: adequate fluid intake promoted  Medication Review/Management: medications reviewed       Goal Outcome Evaluation:    Patient slept most of the evening after dinner.   Pain appears controlled with scheduled meds and one prn dose of oral dilaudid.   Pt appears comfortable and eager to have a plan with oncology after discharge.   Up with walker.  Slightly hypertensive. Pt on meds.

## 2025-07-11 NOTE — PROGRESS NOTES
[facility]    Medicine Progress Note - Hospitalist Service    Date of Admission:  6/29/2025    Assessment & Plan      Isai Leong is a 69 year old male admitted on 6/29/2025. He has PMH of depression, hypertension, headaches and is admitted for chronic right sided abdominal pain found to have a large mass in his colon with likely metastatic liver disease. Remains hospitalized for pain control - palliative following. Potassium elevated on 7/9 and persistently borderline. Anticipate discharge home w/home cares tomorrow.     Lobulated Mass of Ascending Colon, with Ileum and Lymph Node Involvement  Metastatic Liver Lesions  Presumed cancer pain  Chronic right abdominal pain with masses as above, CT imaging with very large liver and multiple lesions along with the ascending colon mass. Physical exam consistent with large nodular liver, likely primary cancer of the colon with metastasis to the liver. Weight loss, poor appetite, also suggestive of underlying cancer. S/p liver bx w/IR on 6/30 - initial surgical pathology showing colon cancer with metastasis. Palliative following - given increasing total daily MME, CMP wnl, and EKG w/normal Qtc, methadone for pain control initiated 7/8. Mentation improved w/reducing frequency of methadone, but pain persistently 8/10 - suspect this may be the most improvement at this time. Patient does appear comfortable at rest and RN reports he moves fairly well. PT recommending TCU, but unsure about rehab capacity in the setting of advanced cancer. Will plan to discharge home w/home cares and close follow-up w/both Oncology and palliative clinic.   -Palliative consult, recs appreciated     -Pain regimen:   - Gabapentin 100mg TID, do not exceed 300mg d d/t hepatic impairment    - Decadron 2mg q6h    - Methadone 5mg q12h   - Dilaudid 1-2mg PO q3h PRN, 0.3mg IV q3h PRN   - Hydroxyzine 10mg TID PRN     - Lidocaine patches q24h    -Tylenol 500mg q6h, avoid additional  tylenol given liver disease   -Robaxin 1000mg q6h    -Venlafaxine 37.5mg d - do not exceed 150mg/d d/t hepatic impairment    -Avoid NSAIDs given GI bleed  - Bowel regimen:   -Miralax daily    -Senna BID PRN   - Referral to palliative clinic on discharge   -Will work with pharmacy to re-time pain medications for ease of management on discharge.   -PT consult, recs appreciated -- recommending TCU.   -Liver bx showing colon cancer w/mets, genotype pending  -Add-on CEA for outpatient oncology follow-up  -Hold on CT chest per oncology curbside   -Referral for [facility] on discharge   -CM/SW consult, recs appreciated -- TCU vs home w/home nursing, PT/OT?    Mild hyperkalemia  Mild hyponatremia   K 5.5, Na 134 on 7/9. Repeat wnl. Cr and GFR wnl. Unclear etiology - not on offending medications. Likely incidental vs less likely related to PO intake.   -Recheck K  -Mild hyperkalemia protocol   -Correct if K > 6    R inguinal hernia  Enlarged, fluid palpated, but reducible in setting of colon cancer w/mets discussed above. Not noted on CT AP on admission.   -Continue to monitor   -Outpatient surgical follow-up if remains benign  -Pain management per above    Microcytic anemia - resolved  MCV 50, Hg 7 on admission, s/p total 2u pRBC. Remains hemodynamically stable and hgb between 9-10. Ferritin 100. PBS w/slight target cells and sickle cells - unclear significance w/lack of personal/family hx of this per chart rvw.   - CBC if becomes hemodynamically unstable     Lactic Acidosis  Likely Type A lactic acidosis, improved with fluid resuscitation and will be getting RBC transfusions.   - No further laboratory testing required    Itching   - Hydroxyzine PRN  - Zyrtec PRN for itching    Housing concerns  Patient requested to speak w/SW to discuss housing. Remote hx of homelessness. He is currently living in an apartment and is concerned about losing this. Did not explicate why with me.   -CM/SW consult, recs appreciated            Diet: Regular Diet Adult  Snacks/Supplements Adult: Ensure Plus High Protein; Between Meals    DVT Prophylaxis: VTE Prophylaxis contraindicated due to acute anemia   Her Catheter: Not present  Lines: None     Cardiac Monitoring: None  Code Status: Full Code      Clinically Significant Risk Factors        # Hyperkalemia: Highest K = 5.5 mmol/L in last 2 days, will monitor as appropriate        # Hypoalbuminemia: Lowest albumin = 2.8 g/dL at 6/29/2025 11:50 PM, will monitor as appropriate     # Hypertension: Noted on problem list             # Severe Malnutrition: based on nutrition assessment and treatment provided per dietitian's recommendations.    # Financial/Environmental Concerns: unable to afford rent/mortgage;other (see comments) (Patient at risk for eviction a he is3 months behind in his rent)         Social Drivers of Health    Tobacco Use: High Risk (5/11/2021)    Received from [employer]    Patient History     Smoking Tobacco Use: Every Day     Smokeless Tobacco Use: Unknown   Interpersonal Safety: High Risk (7/1/2025)    Interpersonal Safety     Do you feel physically and emotionally safe where you currently live?: No     Within the past 12 months, have you been hit, slapped, kicked or otherwise physically hurt by someone?: No     Within the past 12 months, have you been humiliated or emotionally abused in other ways by your partner or ex-partner?: No          Disposition Plan   Medically Ready for Discharge: Anticipated Tomorrow           The patient's care was discussed with the Attending Physician, Dr. Moreno.    Merlin Guevara MD  Hospitalist Service  [facility]  Securely message with [employer] (more info)  Text page via [employer] Paging/Directory   ______________________________________________________________________    Interval History   NAEO. Abdominal pain still 8/10. Denies lightheadedness, brain fogging. Nephew who is able to help him even though he is busy. Eager to  see oncologist.     Physical Exam   Vital Signs: Temp: 97.7  F (36.5  C) Temp src: Oral BP: (!) 154/80 Pulse: 90   Resp: 18 SpO2: 98 % O2 Device: None (Room air)    Weight: 125 lbs 7 oz    Physical Exam  Constitutional:       General: He is not in acute distress.     Appearance: He is not toxic-appearing.      Comments: Appears comfortable. Chatty, but redirectable.    HENT:      Head: Normocephalic and atraumatic.   Pulmonary:      Effort: Pulmonary effort is normal.   Abdominal:      General: There is distension.      Tenderness: There is abdominal tenderness. There is guarding and rebound.   Genitourinary:     Comments: Enlarged, but reducible inguinal hernia R groin. Fluid filled.   Musculoskeletal:      Right lower leg: No edema.      Left lower leg: No edema.   Neurological:      General: No focal deficit present.      Mental Status: He is alert and oriented to person, place, and time.      Comments: Mentation intact. Thought process more normal today. Mood appropriate. Insight fair.    Psychiatric:         Mood and Affect: Mood normal.         Behavior: Behavior normal.           Medical Decision Making       ------------------ MEDICAL DECISION MAKING ------------------------------------------------------------------------------------------------------      Data   ------------------------- PAST 24 HR DATA REVIEWED -----------------------------------------------

## 2025-07-11 NOTE — PLAN OF CARE
Problem: Adult Inpatient Plan of Care  Goal: Plan of Care Review  Description: The Plan of Care Review/Shift note should be completed every shift.  The Outcome Evaluation is a brief statement about your assessment that the patient is improving, declining, or no change.  This information will be displayed automatically on your shift  note.  Outcome: Progressing     Problem: Pain Acute  Goal: Optimal Pain Control and Function  Outcome: Progressing  Intervention: Prevent or Manage Pain  Recent Flowsheet Documentation  Taken 7/11/2025 0000 by Zhanna Julien RN  Sensory Stimulation Regulation: quiet environment promoted   Goal Outcome Evaluation:       PRN oral dilaudid given. Up toilet with stand by assist, walker. PIV, SL.

## 2025-07-11 NOTE — PROGRESS NOTES
Clinical Nutrition Services Note    Visited patient to see if he tried the strawberry Super 1.4 oral supplement.  He had not - asked RD to bring and he will try later.  Pt is eating % of Regular diet and likes vanilla Ensure supplements.

## 2025-07-11 NOTE — PROGRESS NOTES
PALLIATIVE CARE PROGRESS NOTE  [facility]     Patient Name: Isai Leong  Date of Admission: 6/29/2025   Today the patient was seen for: Follow up patient/family support     Recommendations & Counseling       GOALS OF CARE:   Life-prolonging without limits   Palliative care was not consulted to address goals of care, Isai states in passing that he wishes to follow up with oncology and palliative care outpatient for treatment planning and symptom management.  Palliative care clinic referral for follow up symptom management - referral placed in discharge navigator.  Patient states pain is adequately managed for discharge home.  No changes to pain management plan today.    ADVANCE CARE PLANNING:  No health care directive on file. Per system policy, Surrogate Decision-makers for Patients With Diminished Decision-making Capacity offers guidance on possible decision-makers. In absence of HCD, next of kin will serve as surrogate decision maker.  Spouse Luiz Leong is next of kin.  There is no POLST form on file, defer to patient and/or next of kin for decisions   Code status: Full Code    MEDICAL MANAGEMENT:   #Pain,acute on chronic.  Cancer mediated abdominal pain secondary to large liver mass and ascending colon mass with  multiple lesions.  24 hour opioid use: 10 mg Methadone PO, 7 mg hydromorphone PO  Patient reports feelings of lethargy, decreased mental sharpness.  Inquiring about dose reduction of Methadone.   QTc: 473  Recent Labs   Lab Test 07/07/25  0648   PROTTOTAL 6.9   ALBUMIN 3.0*   BILITOTAL 0.3   ALKPHOS 328*   *   ALT 24      Opioids: Hydromorphone 2-4 mg PO q 4 hours prn breakthrough pain, Hydromorphone 0.3 mg IV q 2 hours prn breakthrough pain if unable to take PO  Methadone 5 mg q 12 hours  Acetaminophen (Tylenol), Scheduled  NSAIDs:  Avoid due to GIB  Muscle Relaxers:Methocarbamol (Robaxin) per primary team 1000 mg q 6 hours  Anti-depressants:  Venlafaxine  (Effexor) per primary team. 37.5 mg daily, daily limit of 150 mg due to hepatic impairment  Anti-convulsants:  Gabapentin (Neurontin) 100 mg TID, do not exceed 300 mg daily due to hepatic impairment  Other medicines for pain: Decadron 2 mg q 6 hours, hydroxyzine 10 mg TID PRN  Topicals: lidocaine patch to abdomen q 24 hours.  12 hour period with patch followed by 12 hour period without patch to avoid systemic toxicity  Heat, Ice, and Meditation  Bowel Regimen: Miralax 1 packet daily, Senna-Docusate 1-2 tablet two times daily prn - consider scheduling Senna-docusate if patient is not at baseline bowel frequency  Palliative care referral for ongoing pain management     #Nausea, disease processes - malignancy  -Pharmacologic management   Ondansetron (Zofran)   Prochlorperazine (COMPAZINE)   May see benefit from Decadron initiation  -Nonpharmacologic management  Small, frequent intake  Assess and avoid aggravating factors  Accupressure (C-band)  Aromatherapy, alcohol swabs known to be effective    PSYCHOSOCIAL/SPIRITUAL:  Family : spouse, extended family  [place] community: No [place]ism     Palliative Care will continue to follow. Thank you for the consult and allowing us to aid in the care of Isai Leong.    These recommendations have been discussed with primary team, nursing staff.    GIO North CNP  [facility], Palliative Care  Securely message with the [employer] Web Console (learn more here) or  Text page via [facility] Paging/Directory        Assessment          Isai Leong is a 69 year old male with a past medical history of depression, hypertension, headaches and chronic right sided abdominal pain who presented on 6/29 with abdominal pain and palpable abdominal mass.  Patient also reported ongoing nausea, poor appetite and 35 pound weight loss in the past 3 months.      Patient proceeded to have work up including CT imaging which revealed very large liver mass and multiple lesions along ascending colon  with additional mass.  He underwent biopsy on 6/30 and results indicated primary colon cancer with liver mets.     Patient has continued to have significant abdominal pain which has inhibited his ability to mobilize out of bed or use the bathroom.         Today, the patient was seen for:  Symptom management  Patient support      Interval History:     Multidisciplinary collaboration:  Reported lethargy, decreased mental sharpness.  Elevated potassium today.  Not ready for discharge.    Notable medications:  Methadone, gabapentin, dexamethasone, methocarbamol, tylenol     Patient/family narrative  Met with Isai at the bedside, He was visiting with the hospitalist team planning discharge timing.  Discussed pain management and Isai expressed that his pain has improved since discharge and he does not feel as though he needs any changes in his pain medications.  Reviewed his functional status and that the team is encouraged that he has been able to mobilize more comfortably. Reviewed the need for close palliative care follow up to continue symptom management.  Isai verbalized understanding.      Review of Systems:     Besides above, ROS was reviewed and is unremarkable        Physical Exam:   Temp:  [97.4  F (36.3  C)-97.7  F (36.5  C)] 97.7  F (36.5  C)  Pulse:  [77-90] 90  Resp:  [16-18] 18  BP: (150-167)/(80-92) 154/80  SpO2:  [94 %-99 %] 98 %  125 lbs 7 oz    Physical Exam  Vitals reviewed.   Cardiovascular:      Pulses: Normal pulses.   Pulmonary:      Effort: Pulmonary effort is normal.   Skin:     General: Skin is warm and dry.   Neurological:      General: No focal deficit present.      Mental Status: He is alert.   Psychiatric:         Mood and Affect: Mood normal.           Data Reviewed:     Recent Results (from the past 24 hours)   Potassium   Result Value Ref Range    Potassium 4.9 3.4 - 5.3 mmol/L   Potassium   Result Value Ref Range    Potassium 5.3 3.4 - 5.3 mmol/L   Extra Purple Top EDTA (LAB USE ONLY)    Result Value Ref Range    Hold Specimen [facility]          Medical Decision Making       35 MINUTES SPENT BY ME on the date of service doing chart review, history, exam, documentation & further activities per the note.

## 2025-07-11 NOTE — PLAN OF CARE
Shift from 0700 to 1530-    Problem: Pain Chronic (Persistent)  Goal: Optimal Pain Control and Function  Outcome: Progressing  Intervention: Develop Pain Management Plan  Recent Flowsheet Documentation  Taken 7/11/2025 1445 by Emily Singh RN  Pain Management Interventions: emotional support  Taken 7/11/2025 1408 by Emily Singh RN  Pain Management Interventions: medication (see MAR)  Taken 7/11/2025 1137 by Emily Singh RN  Pain Management Interventions: medication (see MAR)  Taken 7/11/2025 0920 by Emily Singh RN  Pain Management Interventions: emotional support  Taken 7/11/2025 0839 by Emily Singh RN  Pain Management Interventions:   medication (see MAR)   emotional support  Intervention: Manage Persistent Pain  Recent Flowsheet Documentation  Taken 7/11/2025 0822 by Emily Singh RN  Bowel Elimination Promotion: adequate fluid intake promoted  Medication Review/Management: medications reviewed         Goal Outcome Evaluation:    Pain appears controlled with scheduled meds and two prn doses of oral dilaudid.     Pt appears comfortable and eager to have a plan with oncology after discharge.   Up with walker. Ambulated in hallway.     Slightly hypertensive. Pt on meds. MD aware.  MD watching K+ 5.3.    Possible discharge to home tomorrow.

## 2025-07-12 VITALS
TEMPERATURE: 97.6 F | WEIGHT: 125.44 LBS | OXYGEN SATURATION: 98 % | HEIGHT: 70 IN | RESPIRATION RATE: 16 BRPM | BODY MASS INDEX: 17.96 KG/M2 | DIASTOLIC BLOOD PRESSURE: 89 MMHG | SYSTOLIC BLOOD PRESSURE: 157 MMHG | HEART RATE: 92 BPM

## 2025-07-12 LAB
CREAT SERPL-MCNC: 0.66 MG/DL (ref 0.67–1.17)
EGFRCR SERPLBLD CKD-EPI 2021: >90 ML/MIN/1.73M2
INTERPRETATION: NORMAL
MCV RBC AUTO: 57 FL (ref 78–100)
PLATELET # BLD AUTO: 550 10E3/UL (ref 150–450)

## 2025-07-12 PROCEDURE — 250N000013 HC RX MED GY IP 250 OP 250 PS 637

## 2025-07-12 PROCEDURE — 36415 COLL VENOUS BLD VENIPUNCTURE: CPT

## 2025-07-12 PROCEDURE — 250N000011 HC RX IP 250 OP 636

## 2025-07-12 PROCEDURE — 250N000013 HC RX MED GY IP 250 OP 250 PS 637: Performed by: NURSE PRACTITIONER

## 2025-07-12 PROCEDURE — 82565 ASSAY OF CREATININE: CPT

## 2025-07-12 PROCEDURE — 99238 HOSP IP/OBS DSCHRG MGMT 30/<: CPT | Mod: GC

## 2025-07-12 PROCEDURE — 250N000012 HC RX MED GY IP 250 OP 636 PS 637: Performed by: NURSE PRACTITIONER

## 2025-07-12 PROCEDURE — 85049 AUTOMATED PLATELET COUNT: CPT

## 2025-07-12 RX ORDER — SIMETHICONE 80 MG
80 TABLET,CHEWABLE ORAL
Status: DISCONTINUED | OUTPATIENT
Start: 2025-07-12 | End: 2025-07-12 | Stop reason: HOSPADM

## 2025-07-12 RX ADMIN — DEXAMETHASONE 2 MG: 2 TABLET ORAL at 05:38

## 2025-07-12 RX ADMIN — METHADONE HYDROCHLORIDE 5 MG: 5 SOLUTION ORAL at 10:12

## 2025-07-12 RX ADMIN — AMLODIPINE BESYLATE 5 MG: 5 TABLET ORAL at 08:52

## 2025-07-12 RX ADMIN — ENOXAPARIN SODIUM 40 MG: 40 INJECTION SUBCUTANEOUS at 08:52

## 2025-07-12 RX ADMIN — LIDOCAINE 2 PATCH: 4 PATCH TOPICAL at 10:13

## 2025-07-12 RX ADMIN — DEXAMETHASONE 2 MG: 2 TABLET ORAL at 01:38

## 2025-07-12 RX ADMIN — VENLAFAXINE HYDROCHLORIDE 37.5 MG: 37.5 CAPSULE, EXTENDED RELEASE ORAL at 08:52

## 2025-07-12 RX ADMIN — HYDROMORPHONE HYDROCHLORIDE 2 MG: 2 TABLET ORAL at 01:38

## 2025-07-12 RX ADMIN — ACETAMINOPHEN 500 MG: 500 TABLET ORAL at 01:38

## 2025-07-12 RX ADMIN — METHOCARBAMOL 1000 MG: 500 TABLET ORAL at 01:38

## 2025-07-12 RX ADMIN — ACETAMINOPHEN 500 MG: 500 TABLET ORAL at 08:52

## 2025-07-12 RX ADMIN — HYDROMORPHONE HYDROCHLORIDE 0.3 MG: 1 INJECTION, SOLUTION INTRAMUSCULAR; INTRAVENOUS; SUBCUTANEOUS at 08:52

## 2025-07-12 RX ADMIN — METHOCARBAMOL 1000 MG: 500 TABLET ORAL at 12:01

## 2025-07-12 RX ADMIN — METHOCARBAMOL 1000 MG: 500 TABLET ORAL at 05:38

## 2025-07-12 RX ADMIN — DEXAMETHASONE 2 MG: 2 TABLET ORAL at 12:01

## 2025-07-12 RX ADMIN — GABAPENTIN 100 MG: 100 CAPSULE ORAL at 08:53

## 2025-07-12 ASSESSMENT — ACTIVITIES OF DAILY LIVING (ADL)
ADLS_ACUITY_SCORE: 62

## 2025-07-12 NOTE — PROGRESS NOTES
Care Management Discharge Note    Discharge Date: 07/12/2025     Discharge Disposition: Home    Discharge Services:  Home Care (RN/PT)    Discharge Transportation: family or friend will provide    Private pay costs discussed: Not applicable    Does the patient's insurance plan have a 3 day qualifying hospital stay waiver?  No    PAS Confirmation Code:  N/A  Patient/family educated on Medicare website which has current facility and service quality ratings:  N/A    Persons Notified of Discharge Plans: Patient aware  Patient/Family in Agreement with the Plan:  Yes    Handoff Referral Completed: No, handoff not indicated or clinically appropriate    Additional Information:  Patient meeting discharge goals, pain managed on oral PRN's, up ambulating in [place]. Accepted for home care services. Discharging today. No further care management intervention anticipated at this time.      Gardenia Zuniga RN

## 2025-07-12 NOTE — PLAN OF CARE
Problem: Comorbidity Management  Goal: Blood Pressure in Desired Range  Outcome: Not Progressing     Problem: Pain Acute  Goal: Optimal Pain Control and Function  Outcome: Progressing  Intervention: Develop Pain Management Plan  Recent Flowsheet Documentation  Taken 7/11/2025 1842 by Jhoana Gandhi RN  Pain Management Interventions: medication (see MAR)   Goal Outcome Evaluation:       /79, was rechecked for 154/77, MD aware, C/O abdominal pain, prn dilaudid was helpful, ambulated in hallway, alert and oriented, refuses bed alarm.

## 2025-07-12 NOTE — DISCHARGE SUMMARY
[facility]  Discharge Summary - Medicine & Pediatrics       Date of Admission:  6/29/2025  Date of Discharge:  7/12/2025  Discharging Provider: Dr. Hooker, Dr. Moreno  Discharge Service: Hospitalist Service    Discharge Diagnoses   Lobulated Mass of Ascending Colon, with Ileum and Lymph Node Involvement  Metastatic Liver Lesions  Presumed cancer pain  Mild hyperkalemia, resolved  Mild hyponatremia, resolved  Severe malnutrition  R inguinal hernia   Microcytic anemia  Lactic acidosis    Clinically Significant Risk Factors     # Severe Malnutrition: based on nutrition assessment and treatment provided per dietitian's recommendations.      Follow-ups Needed After Discharge   Follow-up Appointments       Follow Up      Please follow-up with the palliative pain clinic early next week.  They should be reaching out to you to help schedule this appointment.    Shortly after, please also follow-up with [facility] to discuss your colon cancer and treatment options.  They should also be reaching out to you to help set up this appointment.              Unresulted Labs Ordered in the Past 30 Days of this Admission       Date and Time Order Name Status Description    7/3/2025  1:33 PM HER 2 SANIA FISH In process         These results will be followed up by PCP / Oncology    Discharge Disposition   Discharged to home with home cares  Condition at discharge: Stable    Hospital Course   Isai Leong was admitted on 6/29/2025 for chronic right sided abdominal pain found to have a colonic mass with metastatic liver disease. Discharged home with home cares and plan for outpatient Oncology and Palliative care. The following problems were addressed during his hospitalization:    Lobulated Mass of Ascending Colon, with Ileum and Lymph Node Involvement  Metastatic Liver Lesions  Presumed cancer pain  Presented for chronic right abdominal pain with weight loss and poor appetite. Physical exam consistent with  large nodular liver. CT imaging with very large liver and multiple lesions along with the ascending colon mass. S/p liver bx w/IR on 6/30 - initial surgical pathology showing colon cancer with metastasis. Palliative consulted and recommended methadone for pain control, initiated 7/8. Mentation improved w/reducing frequency of methadone. Patient does appear comfortable at rest and RN reports he moves fairly well. PT recommending TCU, but unsure about rehab capacity in the setting of advanced cancer. Will plan to discharge home w/home cares and close follow-up w/both Oncology and palliative clinic.   -Pain regimen:              - Gabapentin 100mg TID, do not exceed 300mg d d/t hepatic impairment               - Decadron 2mg q6h               - Methadone 5mg q12h              - Dilaudid 0.5-1mg [id number] prn breakthrough pain              - Hydroxyzine 10mg TID PRN                           - Lidocaine patches q24h               -Tylenol 500mg q6h, avoid additional tylenol given liver disease              -Robaxin 1000mg q6h               -Venlafaxine 37.5mg d - do not exceed 150mg/d d/t hepatic impairment               -Avoid NSAIDs given GI bleed  - Bowel regimen:              -Miralax daily               -Senna BID PRN   - Referral to Palliative  -Referral for [facility]     Mild hyperkalemia, resolved  Mild hyponatremia, resolved  K 5.5, Na 134 on 7/9. Repeat wnl. Cr and GFR wnl. Unclear etiology - not on offending medications. Likely incidental vs less likely related to PO intake.      R inguinal hernia  Enlarged, fluid palpated, but reducible in setting of colon cancer w/mets discussed above. Not noted on CT AP on admission. Recommend outpatient surgical follow-up if remains benign and within goals of care.     Microcytic anemia  Hgb 7.6 with MCV 50 on admission, s/p total 2u pRBC. Remains hemodynamically stable and hgb between 9-10. Ferritin 100. PBS w/slight target cells and sickle cells - unclear  significance w/lack of personal/family hx of this per chart review.      Lactic Acidosis  Likely Type A lactic acidosis, improved with fluid resuscitation.      Consultations This Hospital Stay   INTERVENTIONAL RADIOLOGY ADULT/PEDS IP CONSULT  PALLIATIVE CARE ADULT IP CONSULT  CARE MANAGEMENT / SOCIAL WORK IP CONSULT  CARE MANAGEMENT / SOCIAL WORK IP CONSULT  PHYSICAL THERAPY ADULT IP CONSULT    Code Status   Full Code     The patient was discussed with Dr. Josh Hooker MD  [place] [facility] [address] [place] [address]  Phone: [phone]  Fax: [phone]  ______________________________________________________________________    Physical Exam   Vital Signs: Temp: 97.6  F (36.4  C) Temp src: Oral BP: (!) 157/89 Pulse: 92   Resp: 16 SpO2: 98 % O2 Device: None (Room air)    Weight: 125 lbs 7 oz    Constitutional: awake, alert, cooperative, no apparent distress sitting up in bed  Temporal wasting  Respiratory: Clear to auscultation bilaterally, no crackles or wheezing  Cardiovascular: Regular rate and rhythm, normal S1 and S2  GI: Distended, tender to palpation, large nodular liver  Skin: normal skin color, texture, turgor  Neurologic: Awake, alert, oriented to name, place and time.  Cranial nerves II-XII are grossly intact.    Primary Care Physician   [facility]    Discharge Orders      Primary Care - Care Coordination Referral      Adult Palliative Care  Referral      Adult Oncology/Hematology  Referral      Home Care Referral      Reason for your hospital stay    You were hospitalized for severe abdominal pain, found to have colon cancer with spread to the liver.  Your stay was prolonged due to difficulty controlling your pain.     Activity    Your activity upon discharge: activity as tolerated     Follow Up    Please follow-up with the palliative pain clinic early next week.  They should be reaching out to you  to help schedule this appointment.    Shortly after, please also follow-up with [facility] to discuss your colon cancer and treatment options.  They should also be reaching out to you to help set up this appointment.     Diet    Follow this diet upon discharge: Current Diet:Orders Placed This Encounter      Snacks/Supplements Adult: Ensure Plus High Protein; Between Meals      Regular Diet Adult       Significant Results and Procedures   Most Recent 3 CBC's:  Recent Labs   Lab Test 07/12/25  0809 07/09/25  0613 07/06/25  0707 07/03/25  0630 07/02/25  0632 07/01/25  0629 06/30/25  1108   WBC  --   --   --   --  9.9 9.6 8.8   HGB  --   --   --   --  9.8* 9.6* 10.0*   MCV 57* 56* 57*   < > 56* 56* 57*   * 457* 407   < > 404 393 410    < > = values in this interval not displayed.     Most Recent 3 BMP's:  Recent Labs   Lab Test 07/12/25  0809 07/11/25  0635 07/10/25  1100 07/10/25  0604 07/09/25  1023 07/09/25  0613 07/08/25  0630 07/07/25  0648 07/03/25  0630 07/02/25  0632   NA  --   --   --   --   --  134*  --  135  --  136   POTASSIUM  --  5.3 4.9 5.5*   < > 5.5*   < > 4.8  4.8   < > 4.4   CHLORIDE  --   --   --   --   --  93*  --  97*  --  98   CO2  --   --   --   --   --  33*  --  31*  --  32*   BUN  --   --   --   --   --  37.1*  --  25.4*  --  9.9   CR 0.66*  --   --   --   --  0.81  --  0.77   < > 0.81  0.81   ANIONGAP  --   --   --   --   --  8  --  7  --  6*   DANIELA  --   --   --   --   --  9.6  --  9.3  --  8.8   GLC  --   --   --   --   --  120*  --  77  --  73    < > = values in this interval not displayed.     Most Recent 2 LFT's:  Recent Labs   Lab Test 07/09/25  0613 07/07/25  0648   AST 98* 110*   ALT 36 24   ALKPHOS 391* 328*   BILITOTAL <0.2 0.3     Most Recent 3 INR's:  Recent Labs   Lab Test 06/30/25  1108   INR 1.08     Most Recent Urinalysis:  Recent Labs   Lab Test 06/30/25  1801   COLOR Light Yellow   APPEARANCE Clear   URINEGLC Negative   URINEBILI Negative   URINEKETONE Negative    SG 1.016   UBLD Negative   URINEPH 5.5   PROTEIN Negative   NITRITE Negative   LEUKEST Negative   RBCU <1   WBCU 1     Most Recent Anemia Panel:  Recent Labs   Lab Test 07/12/25  0809 07/03/25  0630 07/02/25  0632 06/30/25  0226 06/29/25  2350   WBC  --   --  9.9   < > 7.7   HGB  --   --  9.8*   < > 7.6*   HCT  --   --  28.7*   < > 23.0*   MCV 57*   < > 56*   < > 50*   *   < > 404   < > 449   ROHAN  --   --   --   --  100    < > = values in this interval not displayed.     Results for orders placed or performed during the hospital encounter of 06/29/25   CT Abdomen Pelvis w Contrast    Narrative    EXAM: CT ABDOMEN PELVIS W CONTRAST  LOCATION: [facility]  DATE: 6/30/2025    INDICATION: Right-sided abdominal pain and nausea.  COMPARISON: CT abdomen pelvis 9/17/2013  TECHNIQUE: CT scan of the abdomen and pelvis was performed following injection of IV contrast. Multiplanar reformats were obtained. Dose reduction techniques were used.  CONTRAST: 90 ml isovue 370    FINDINGS:   LOWER CHEST: Scattered atelectasis in the lung bases without consolidation or pleural effusion.    HEPATOBILIARY: Multiple metastatic lesions seen throughout the liver. Gallbladder is normal.    PANCREAS: Normal.    SPLEEN: Normal. Multiple calcified granulomas are present.    ADRENAL GLANDS: Normal.    KIDNEYS/BLADDER: Normal. A 1 mm nonobstructing stone is seen in the left kidney. Too small to characterize cystic lesion in the right kidney is visually benign and does not require follow-up.    BOWEL: A lobulated enhancing circumferential mass is seen in the ascending colon measuring at least 7.5 cm in length with possible involvement of the terminal ileum. No significant proximal small bowel distention to suggest obstruction. Small volume free   fluid. No free air.    LYMPH NODES: Multiple mildly enlarged enhancing nodes are seen in the right lower quadrant adjacent to the right colonic mass, as well as the central  mesentery.    VASCULATURE: Mild aortoiliac atherosclerotic calcifications. No abdominal aortic aneurysm.    PELVIC ORGANS: Normal.    MUSCULOSKELETAL: A high density sebaceous cyst seen in the right groin measuring 1 cm. No suspicious osseous lesions or acute fractures.        Impression    IMPRESSION:     1.  Lobulated circumferential mass in the ascending colon with possible involvement of the terminal ileum and adjacent pericolonic/central mesenteric lymphadenopathy.    2.  Multiple metastatic lesions seen throughout the liver.    3.  1 mm nonobstructing left renal stone.   US Biopsy Liver    Narrative    EXAM:  1. PERCUTANEOUS BIOPSY LIVER  2. ULTRASOUND GUIDANCE  3. CONSCIOUS SEDATION  LOCATION: [facility]  DATE: 6/30/2025    INDICATION: Ascending colonic mass with multiple suspected liver metastasis    PROCEDURE: Informed consent obtained. Site marked. Prior images reviewed. Required items made available. Patient identity was confirmed verbally and with arm band. Patient reevaluated immediately before administering sedation. Universal protocol was   followed. Time out performed. The site was prepped and draped in sterile fashion. 10 mL of 1 percent lidocaine was infused into the local soft tissues. Using standard technique and under direct ultrasound guidance, a 18 gauge biopsy needle was used to   make 5 core biopsies. Tissue was submitted to Pathology and adequate by preliminary review.     The patient tolerated the procedure well. No complications.    SEDATION: Versed 1.5 mg. Fentanyl 100 mcg. The procedure was performed with administration intravenous conscious sedation with appropriate preoperative, intraoperative, and postoperative evaluation.    15 minutes of supervised face to face conscious sedation time was provided by a radiology nurse under my direct supervision.      Impression    IMPRESSION:  Status post US-guided biopsy of liver lesion.    Reference CPT Code: 38549,  66387, 89747       Discharge Medications      Review of your medicines        START taking        Dose / Directions   dexAMETHasone 2 MG tablet  Commonly known as: DECADRON  Used for: RUQ abdominal pain      Dose: 2 mg  Take 1 tablet (2 mg) by mouth every 6 hours.  Quantity: 90 tablet  Refills: 0     gabapentin 100 MG capsule  Commonly known as: NEURONTIN  Used for: RUQ abdominal pain      Dose: 100 mg  Take 1 capsule (100 mg) by mouth 3 times daily.  Quantity: 90 capsule  Refills: 0     HYDROmorphone 2 MG tablet  Commonly known as: DILAUDID  Used for: RUQ abdominal pain      Dose: 1-2 mg  Take 0.5-1 tablets (1-2 mg) by mouth every 3 hours as needed for breakthrough pain.  Quantity: 36 tablet  Refills: 0     hydrOXYzine HCl 10 MG tablet  Commonly known as: ATARAX  Used for: RUQ abdominal pain      Dose: 10 mg  Take 1 tablet (10 mg) by mouth 3 times daily as needed for itching or other (pain adjuvant).  Quantity: 30 tablet  Refills: 0     Lidocaine 4 % Patch  Commonly known as: LIDOCARE      Dose: 2 patch  Place 2 patches over 12 hours onto the skin every 24 hours for 14 days. To prevent lidocaine toxicity, patient should be patch free for 12 hrs daily.  Quantity: 28 patch  Refills: 0     methadone 5 MG/5ML solution  Commonly known as: DOLOPHINE  Used for: RUQ abdominal pain      Dose: 5 mg  Take 5 mLs (5 mg) by mouth every 12 hours.  Quantity: 200 mL  Refills: 0     Methocarbamol 1000 MG Tabs  Used for: RUQ abdominal pain      Dose: 1,000 mg  Take 1,000 mg by mouth every 6 hours.  Quantity: 56 tablet  Refills: 0     naloxone 4 MG/0.1ML nasal spray  Commonly known as: NARCAN      Dose: 4 mg  Spray 1 spray (4 mg) into one nostril alternating nostrils as needed for opioid reversal. every 2-3 minutes until assistance arrives  Quantity: 2 each  Refills: 0     ondansetron 4 MG ODT tab  Commonly known as: ZOFRAN ODT      Dose: 4 mg  Take 1 tablet (4 mg) by mouth every 6 hours as needed for nausea.  Quantity: 30  tablet  Refills: 0     polyethylene glycol 17 GM/Dose powder  Commonly known as: MIRALAX      Dose: 17 g  Take 17 g by mouth daily.  Quantity: 510 g  Refills: 0     senna-docusate 8.6-50 MG tablet  Commonly known as: SENOKOT-S/PERICOLACE  Used for: RUQ abdominal pain      Dose: 1 tablet  Take 1 tablet by mouth 2 times daily as needed for constipation.  Quantity: 60 tablet  Refills: 0     venlafaxine 37.5 MG 24 hr capsule  Commonly known as: EFFEXOR XR  Used for: RUQ abdominal pain      Dose: 37.5 mg  Take 1 capsule (37.5 mg) by mouth daily (with breakfast).  Quantity: 30 capsule  Refills: 0            CHANGE how you take these medications        Dose / Directions   acetaminophen 500 MG tablet  Commonly known as: TYLENOL  This may have changed:   how much to take  when to take this  reasons to take this      Dose: 500 mg  Take 1 tablet (500 mg) by mouth every 6 hours.  Quantity: 60 tablet  Refills: 0     amLODIPine 5 MG tablet  Commonly known as: NORVASC  This may have changed: when to take this      Dose: 5 mg  Take 1 tablet (5 mg) by mouth 2 times daily for 14 days.  Quantity: 28 tablet  Refills: 0               Where to get your medicines        These medications were sent to [facility] [address] [address]      Phone: [phone]   acetaminophen 500 MG tablet  amLODIPine 5 MG tablet  dexAMETHasone 2 MG tablet  gabapentin 100 MG capsule  HYDROmorphone 2 MG tablet  hydrOXYzine HCl 10 MG tablet  Lidocaine 4 % Patch  methadone 5 MG/5ML solution  Methocarbamol 1000 MG Tabs  naloxone 4 MG/0.1ML nasal spray  ondansetron 4 MG ODT tab  polyethylene glycol 17 GM/Dose powder  senna-docusate 8.6-50 MG tablet  venlafaxine 37.5 MG 24 hr capsule       Allergies   Allergies   Allergen Reactions    Chocolate      rash    Cyclobenzaprine Itching

## 2025-07-12 NOTE — PROGRESS NOTES
Physical Therapy Discharge Summary    Reason for therapy discharge:    Discharged to home with home cares.    Progress towards therapy goal(s). See goals on Care Plan in [facility] electronic health record for goal details.  Goals partially met.  Barriers to achieving goals:   discharge from facility.    Therapy recommendation(s):    Further recommended therapy is related to documented deficits, and is necessary to maximize functional independence in order for patient to return to prior level of function.      Serina Hutton, MISA 7/12/2025

## 2025-07-12 NOTE — PLAN OF CARE
"Shift from 2300 to 0730-      Problem: Pain Acute  Goal: Optimal Pain Control and Function  Outcome: Progressing  Intervention: Develop Pain Management Plan  Recent Flowsheet Documentation  Taken 7/12/2025 0230 by Emily Singh RN  Pain Management Interventions: emotional support  Taken 7/12/2025 0138 by Emily Singh RN  Pain Management Interventions: medication (see MAR)  Taken 7/12/2025 0032 by Emily iSngh RN  Pain Management Interventions:   emotional support   medication offered but refused  Intervention: Prevent or Manage Pain  Recent Flowsheet Documentation  Taken 7/12/2025 0032 by Emily Singh RN  Sensory Stimulation Regulation: quiet environment promoted     Goal Outcome Evaluation:  Possible discharge to home today.    Pain appears controlled with scheduled meds and  one prn dose of oral dilaudid.      Up with walker. Encourage ambulation in hallway.     Monitoring K+ levels.     Pt shared that he is \"worried no one will want to be around him if he has cancer.\"  Spent time with him listening.                                    "

## 2025-07-12 NOTE — PLAN OF CARE
Problem: Adult Inpatient Plan of Care  Goal: Plan of Care Review  Description: The Plan of Care Review/Shift note should be completed every shift.  The Outcome Evaluation is a brief statement about your assessment that the patient is improving, declining, or no change.  This information will be displayed automatically on your shift  note.  Outcome: Adequate for Care Transition    Discharge instructions reviewed with patient. Discharged home.    Saray Garner RN

## 2025-07-14 ENCOUNTER — PATIENT OUTREACH (OUTPATIENT)
Dept: ONCOLOGY | Facility: CLINIC | Age: 70
End: 2025-07-14
Payer: MEDICARE

## 2025-07-14 ENCOUNTER — PATIENT OUTREACH (OUTPATIENT)
Dept: CARE COORDINATION | Facility: CLINIC | Age: 70
End: 2025-07-14
Payer: MEDICARE

## 2025-07-14 ENCOUNTER — TELEPHONE (OUTPATIENT)
Dept: FAMILY MEDICINE | Facility: CLINIC | Age: 70
End: 2025-07-14
Payer: MEDICARE

## 2025-07-14 ASSESSMENT — ACTIVITIES OF DAILY LIVING (ADL): DEPENDENT_IADLS:: INDEPENDENT

## 2025-07-14 NOTE — PROGRESS NOTES
New Patient Oncology Nurse Navigator Note     Referring provider: Dr Guevara, Hospital Team [facility]    Referring Clinic/Organization: [facility]     Referred to: Medical Oncology    Requested provider (if applicable): First available - did not specify     Referral Received: 07/14/25       Evaluation for : colon cancer, liver mets     Clinical History (per Nurse review of records provided):      6/29/2025 - 7/12/2025 ED [facility] for abdominal pain.    6/30/2025 CT Abd/Pelvis  IMPRESSION:      1.  Lobulated circumferential mass in the ascending colon with possible involvement of the terminal ileum and adjacent pericolonic/central mesenteric lymphadenopathy.     2.  Multiple metastatic lesions seen throughout the liver.     3.  1 mm nonobstructing left renal stone.      6/30/2025 liver biopsy  Addendum   LIVER, MASS, ULTRASOUND-GUIDED NEEDLE CORE BIOPSY:  - MMR INTERPRETATION: NO LOSS OF NUCLEAR EXPRESSION OF MMR PROTEINS: LOW PROBABILITY OF MSI-H  - HER2 BY IHC: EQUIVOCAL (SCORE 2+), REFLEXED TO HER2 FISH WITH RESULTS BEING SEPARATELY REPORTED IN EPIC  - SEE SYNOPTIC REPORTS     HER2 immunohistochemical stained slide reviewed by Dr. Jignesh White and he concurs with final diagnosis.     The following assays; ALK (D5F3), ER, HER2, PD-L1, BRAF, CD20, CD30 AZF, CD30 Formalin, MLH-1, MSH-2, MSH-6 and PMS-2, have not been validated on decalcified tissues. Results should be interpreted with caution given the possibility of false negative results on decalcified specimens.         Addendum electronically signed by Rodrigo Elkins MD on 7/3/2025 at 1331 CDT   Final Diagnosis   LIVER, MASS, ULTRASOUND-GUIDED NEEDLE CORE BIOPSY:  - METASTATIC MODERATELY DIFFERENTIATED ADENOCARCINOMA WITH NECROSIS, SEE COMMENT  - MISMATCH REPAIR AND HER2 IMMUNOHISTOCHEMICAL STAINS ARE PENDING AND RESULTS WILL BE REPORTED IN AN ADDENDUM   Electronically signed by Rodrigo Elkins MD on 7/2/2025 at 1050 CDT   Comment  [facility]    The patient's clinical history of an ascending colon mass is noted and these findings are compatible with metastatic adenocarcinoma from a colon primary in the appropriate clinical setting.  Clinical and radiologic correlation recommended.       Clinical Assessment / Barriers to Care (Per Nurse):    Pt will need to establish with [facility] outpatient. Pt lives in [place], [place]. We will call to offer first available Onc at [place] (Dr Jarrell 7/25/25) or Dr Friedell at [facility]. Both docs have new consult openings next week. Or schedule per pt preference.        Records Location: [facility]     Records Needed:     N/A    Additional testing needed prior to consult:     N/A      Randy Valles, RN, BSN, OCN  Oncology New Patient Nurse Navigator   [facility] [place]  [phone]

## 2025-07-14 NOTE — PROGRESS NOTES
Clinic Care Coordination Contact  Transitions of Care Outreach  Chief Complaint   Patient presents with    Clinic Care Coordination - Post Hospital     RN CC- post hospital follow up       Most Recent Admission Date: 6/29/2025   Most Recent Admission Diagnosis: Nausea - R11.0  Generalized abdominal pain - R10.84  Liver lesion - K76.9  Intestinal mass - K63.89  Anemia, unspecified type - D64.9     Most Recent Discharge Date: 7/12/2025   Most Recent Discharge Diagnosis: Generalized abdominal pain - R10.84  Anemia, unspecified type - D64.9  Intestinal mass - K63.89  Nausea - R11.0  Liver lesion - K76.9  Metastatic colon cancer to liver (H) - C18.9, C78.7  RUQ abdominal pain - R10.11     Transitions of Care Assessment    Discharge Assessment  How are you doing now that you are home?: per patient report, he is having pain. said he needs a walker or cane. [facility]  How are your symptoms? (Red Flag symptoms escalate to triage hotline per guidelines): Unchanged  Do you know how to contact your clinic care team if you have future questions or changes to your health status? : Yes  Does the patient have their discharge instructions? : Yes  Does the patient have questions regarding their discharge instructions? : No  Were you started on any new medications or were there changes to any of your previous medications? : Yes  Does the patient have all of their medications?: Yes  Do you have questions regarding any of your medications? : No  Do you have all of your needed medical supplies or equipment (DME)?  (i.e. oxygen tank, CPAP, cane, etc.): No - What equipment or supplies are needed? (walker or cane)         Post-op (Clinicians Only)  Did the patient have surgery or a procedure: No  Fever: No  Chills: No    Follow up Plan     Discharge Follow-Up  Discharge follow up appointment scheduled in alignment with recommended follow up timeframe or Transitions of Risk Category?  (Low = within 30 days; Moderate= within 14 days; High= within 7 days): No    Future Appointments   Date Time Provider Department Center   7/15/2025 11:00 AM Silvio Stewart APRN CNP [facility]       Outpatient Plan as outlined on AVS reviewed with patient.    For any urgent concerns, please contact our 24 hour nurse triage line: [phone] ([phone])       Patient declines care coordination at this time.    Sudha Del Rio RN

## 2025-07-14 NOTE — TELEPHONE ENCOUNTER
Home Care is calling regarding a non-established patient with [facility]   Informed caller, patient will need to establish care with a provider by scheduling an appointment.    Nurse will offer to transfer to scheduling.          Angie Cota RN

## 2025-07-15 ENCOUNTER — VIRTUAL VISIT (OUTPATIENT)
Dept: PALLIATIVE MEDICINE | Facility: CLINIC | Age: 70
End: 2025-07-15
Attending: NURSE PRACTITIONER
Payer: MEDICARE

## 2025-07-15 ENCOUNTER — TELEPHONE (OUTPATIENT)
Dept: PALLIATIVE MEDICINE | Facility: CLINIC | Age: 70
End: 2025-07-15

## 2025-07-15 VITALS — HEIGHT: 71 IN | WEIGHT: 125 LBS | BODY MASS INDEX: 17.5 KG/M2

## 2025-07-15 DIAGNOSIS — R10.11 RUQ ABDOMINAL PAIN: ICD-10-CM

## 2025-07-15 DIAGNOSIS — R10.84 GENERALIZED ABDOMINAL PAIN: ICD-10-CM

## 2025-07-15 DIAGNOSIS — C78.7 METASTATIC COLON CANCER TO LIVER (H): ICD-10-CM

## 2025-07-15 DIAGNOSIS — C18.9 METASTATIC COLON CANCER TO LIVER (H): ICD-10-CM

## 2025-07-15 PROCEDURE — 1125F AMNT PAIN NOTED PAIN PRSNT: CPT | Mod: 95 | Performed by: NURSE PRACTITIONER

## 2025-07-15 PROCEDURE — 98007 SYNCH AUDIO-VIDEO EST HI 40: CPT | Performed by: NURSE PRACTITIONER

## 2025-07-15 PROCEDURE — 99417 PROLNG OP E/M EACH 15 MIN: CPT | Performed by: NURSE PRACTITIONER

## 2025-07-15 RX ORDER — METHADONE HYDROCHLORIDE 5 MG/5ML
5 SOLUTION ORAL EVERY 12 HOURS
Qty: 300 ML | Refills: 0 | Status: SHIPPED | OUTPATIENT
Start: 2025-07-15 | End: 2025-07-16

## 2025-07-15 RX ORDER — HYDROMORPHONE HYDROCHLORIDE 2 MG/1
2 TABLET ORAL
Qty: 45 TABLET | Refills: 0 | Status: SHIPPED | OUTPATIENT
Start: 2025-07-15

## 2025-07-15 ASSESSMENT — PAIN SCALES - GENERAL: PAINLEVEL_OUTOF10: SEVERE PAIN (8)

## 2025-07-15 NOTE — NURSING NOTE
Current patient location: [address]  [place] [address]    Is the patient currently in the state of [place]? YES    Visit mode: VIDEO    If the visit is dropped, the patient can be reconnected by:VIDEO VISIT: Text to cell phone:   Telephone Information:   Mobile [phone]       Will anyone else be joining the visit? NO  (If patient encounters technical issues they should call [phone]    Are changes needed to the allergy or medication list? No    Are refills needed on medications prescribed by this physician? NO    Rooming Documentation:  Questionnaire(s) completed    Reason for visit: SHARA PRIEST

## 2025-07-15 NOTE — PATIENT INSTRUCTIONS
Thank you for meeting with us in the [facility].    How to get a hold of us:  For non-urgent matters, MyChart works best.    For more urgent matters, or if you prefer not to use MyChart, call our clinic nurse coordinator Nayely Urbina RN at [phone] or [phone]    We have an on-call number for evenings and weekends. Please call this only if you are having uncontrolled symptoms or serious side effects from your medicines: [phone].     For refills, please give us a week (5 working days) notice. We don't always have providers available everyday to do refills. If you call the day you run out of your medicine, we may not be able to refill it in time, so call 5 days in advance!

## 2025-07-15 NOTE — TELEPHONE ENCOUNTER
[facility] Call Center    Phone Message    May a detailed message be left on voicemail: Unk   Caller name: Margie HERNANDEZ [phone]  F [phone]    Reason for Call: Medication Question or concern regarding medication     Prescription Clarification  Name of Medication: methadone (DOLOPHINE) 5 MG/5ML solution    Prescribing Provider: Silvio Stewart     Pharmacy: [id number] - [place], [place] - [address] AT [address]     What on the order needs clarification?   1-non formulary  2-medication on back order at this [facility] location, and they cannot order more      Action Taken: Message routed to:  Other: Palliative    Travel Screening: Not Applicable     Thanks A

## 2025-07-15 NOTE — PROGRESS NOTES
Virtual Visit Details    Type of service:  Video Visit   Video Start Time: 11 AM  Video End Time: 11:43 AM    Originating Location (pt. Location): Home    Distant Location (provider location):  On-site  Platform used for Video Visit: [facility]    Palliative Care Outpatient Clinic      Patient ID/Chief Complaint: Isai Leong 69 year old male who is presenting to the palliative medicine clinic today at the request of GIO Chadwick for a palliative care follow-up after hospitalization secondary to ongoing symptom management and supportive care.   The patient's primary care provider is:  Jae Bond       Impression & Recommendations:  69-year-old male admitted June 20 9/20/2025 through 7/11/2025 to [facility] with right-sided abdominal pain.  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  Palliative care was consulted to assist with symptom management and goals of care.    For suspected cancer associated pain during hospitalization, he was receiving:  Methadone liquid 5 mg twice daily  Dilaudid 2 to 4 mg every 4 hours as needed  Robaxin 1000 mg every 6 hours as needed  Methadone was decreased from 7.5 mg twice daily to 5 mg twice daily decreased due to concerns for lethargy and altered mental status    Symptoms/recommendations/discussion:  Goals of care:  Patient has repeatedly stated he wants to establish care with oncology and seek treatment  Advance care planning:  Has not completed healthcare directive.  Encouraged him to.  He is no longer  and does not want his ex-wife Venoris involved.   Suspected cancer associated abdominal pain (deep, throbbing) in the right upper abdomen that extends into his back:  Extensive education provided today about purpose of methadone and Dilaudid.  His nephew Emeterio and home health care RN Edward were present and plan to help him better manage opioid  "medications.  I expressed concern about him being able to keep them straight.  Home health care RN placed consult OT  Refills for methadone and Dilaudid sent to pharmacy  Confirmed he did  Narcan nasal spray, and asked him to sure family members know what that medication is for and where this  Discussed opioid safety  Continue methadone liquid 5 mg twice daily  Continue Dilaudid 2 mg every 3 hours as needed  Asked him to keep track of Dilaudid use to allow for more informed possible dose changes to methadone  At risk for opioid-induced constipation:  Denies he is constipated.  Has home laxatives if needed  Decreased appetite:  Discussed with him small frequent meals  May benefit from oncology dietitian  Medical cannabis certification sent  Provided him with phone number to call to schedule with oncology  Palliative care follow-up in approximately 2 weeks, call earlier if needed        How to get a hold of us:  For non-urgent matters, MyChart works best.    For more urgent matters, or if you prefer not to use MyChart, call our clinic nurse coordinator Nayely BUNCH at [phone] or [phone]    We have an on-call number for evenings and weekends. Please call this only if you are having uncontrolled symptoms or serious side effects from your medicines: [phone].     For refills, please give us a week (5 working days) notice. We don't always have providers available everyday to do refills. If you call the day you run out of your medicine, we may not be able to refill it in time, so call 5 days in advance        History:  History gathered today from: patient, family/loved ones, medical chart      PE: Ht 1.803 m (5' 11\")   Wt 56.7 kg (125 lb)   BMI 17.43 kg/m     Wt Readings from Last 3 Encounters:   07/15/25 56.7 kg (125 lb)   07/03/25 56.9 kg (125 lb 7 oz)   09/28/16 69.4 kg (153 lb)       Gen alert, comfortable appearing  Head NCAT.  Eyes anicteric without injection  Face symmetric, eyes conjugate  Lungs " unlabored, no cough, speaking full sentences  Skin no rashes or lesions evident on face/neck  Neuro Face symmetric, eyes conjugate; speech fluent.  Neuropsych seems to struggle with differentiating between opioid pain medications and dosing schedule        Data reviewed:  I reviewed electrolytes, BUN/creatinine, liver profile, hemoglobin and hematocrit, platelet count, and most recent imaging  Recent hospitalization notes    [facility] database reviewed: 7/15/2025        76 minutes spent on the date of the encounter doing chart review, history and exam, patient education & counseling, documentation and other activities as noted above.        Thank you for involving us in the patient's care.   GIO Monreal, [facility]

## 2025-07-16 ENCOUNTER — TELEPHONE (OUTPATIENT)
Dept: PALLIATIVE CARE | Facility: CLINIC | Age: 70
End: 2025-07-16
Payer: MEDICARE

## 2025-07-16 DIAGNOSIS — R10.11 RUQ ABDOMINAL PAIN: ICD-10-CM

## 2025-07-16 RX ORDER — METHADONE HYDROCHLORIDE 5 MG/5ML
5 SOLUTION ORAL EVERY 12 HOURS
Qty: 300 ML | Refills: 0 | Status: SHIPPED | OUTPATIENT
Start: 2025-07-16 | End: 2025-07-17

## 2025-07-16 NOTE — TELEPHONE ENCOUNTER
Resending methadone prescription to a different pharmacy who has it in stock.    RAMÓN GellerN, RN  Palliative Care Nurse Clinician    [phone] (Direct)  [phone] (Main)  [phone] (Appointment Scheduling)

## 2025-07-16 NOTE — TELEPHONE ENCOUNTER
Prior Authorization Retail Medication Request    Medication/Dose: Methadone  Diagnosis and ICD code (if different than what is on RX):     New/renewal/insurance change PA/secondary ins. PA:  Previously Tried and Failed:     Rationale:       Insurance   Primary:    Insurance ID:       Secondary (if applicable):   Insurance ID:       Pharmacy Information (if different than what is on RX)  Name:     Phone:     Fax:     Clinic Information  Preferred routing pool for dept communication: Bernadette Urbina RN

## 2025-07-17 ENCOUNTER — TELEPHONE (OUTPATIENT)
Dept: PALLIATIVE CARE | Facility: CLINIC | Age: 70
End: 2025-07-17
Payer: MEDICARE

## 2025-07-17 DIAGNOSIS — G89.3 CANCER ASSOCIATED PAIN: ICD-10-CM

## 2025-07-17 DIAGNOSIS — C78.7 METASTATIC COLON CANCER TO LIVER (H): Primary | ICD-10-CM

## 2025-07-17 DIAGNOSIS — C18.9 METASTATIC COLON CANCER TO LIVER (H): Primary | ICD-10-CM

## 2025-07-17 RX ORDER — METHADONE HYDROCHLORIDE 5 MG/1
5 TABLET ORAL EVERY 12 HOURS
Qty: 60 TABLET | Refills: 0 | Status: SHIPPED | OUTPATIENT
Start: 2025-07-17

## 2025-07-17 NOTE — TELEPHONE ENCOUNTER
PA Initiation    Medication: METHADONE HCL 5 MG PO TABS  Insurance Company: [employer] - Phone [phone] Fax [phone]  Pharmacy Filling the Rx: [place], [place] - [facility]  Filling Pharmacy Phone: [phone]  Filling Pharmacy Fax:    Start Date: 7/17/2025  Retail Pharmacy Prior Authorization Team   Phone: [phone]

## 2025-07-17 NOTE — TELEPHONE ENCOUNTER
Sending prescription for methadone tabs due to insurance refusal for liquid.    Nayely FERGUSON BSN, RN  Palliative Care Nurse Clinician    [phone] (Direct)  [phone] (Main)  [phone] (Appointment Scheduling)

## 2025-07-17 NOTE — TELEPHONE ENCOUNTER
Prior Authorization Retail Medication Request    Medication/Dose: Methadone 5 mg tabs  Diagnosis and ICD code (if different than what is on RX):     New/renewal/insurance change PA/secondary ins. PA:  Previously Tried and Failed:     Rationale:       Insurance   Primary:    Insurance ID:       Secondary (if applicable):   Insurance ID:       Pharmacy Information (if different than what is on RX)  Name:     Phone:     Fax:     Clinic Information  Preferred routing pool for dept communication: Bernadette Urbina RN

## 2025-07-17 NOTE — TELEPHONE ENCOUNTER
Prior Authorization Approval    Medication: METHADONE HCL 5 MG PO TABS  Authorization Effective Date: 7/3/2025  Authorization Expiration Date:  Until Further Notice  Approved Dose/Quantity:   Reference #:     Insurance Company: [employer] - [employer] [phone] Fax [phone]  Expected CoPay: $    CoPay Card Available:      Financial Assistance Needed: No  Which Pharmacy is filling the prescription: [facility] [place], [place] - [address] [place] [address]  Pharmacy Notified: Yes  Patient Notified: Instructed pharmacy to notify patient once order is ready.

## 2025-07-21 NOTE — TELEPHONE ENCOUNTER
RECORDS STATUS - ALL OTHER DIAGNOSIS      RECORDS RECEIVED FROM: [facility] - Internal records   DATE RECEIVED: 7/21

## 2025-07-24 ENCOUNTER — TELEPHONE (OUTPATIENT)
Dept: ONCOLOGY | Facility: CLINIC | Age: 70
End: 2025-07-24
Payer: MEDICARE

## 2025-07-24 DIAGNOSIS — C18.9 METASTATIC COLON CANCER TO LIVER (H): Primary | ICD-10-CM

## 2025-07-24 DIAGNOSIS — C78.7 METASTATIC COLON CANCER TO LIVER (H): Primary | ICD-10-CM

## 2025-07-24 NOTE — PROGRESS NOTES
"CLINICAL NUTRITION SERVICES- ONCOLOGY DISTRESS SCREENING     Identified Concern and Score From Distress Screenin. How concerned are you about your ability to eat? :  9  2. How concerned are you about unintended weight loss or your current weight? : 2     Date of Distress Screenin/15     Findings: Phone call with Isai. Reports that he has a poor appetite and feels full with meals. Currently eating small meals and drinks Ensure Plus, vanilla and strawberry. Reports that Ensure Plus is difficult for him to get sometimes.      Intervention: Discussed current intake and ways to increase calories and protein. Discussed other nutrition supplements (Whole Milk with [place] Instant Breakfast and Equate Plus) as well. Writer will also send Ensure coupons. Writer sent Boost Plus order to [employer].   Handouts (via email): \"High-Calorie, High Protein Diet\" and \"How to Make a High-Calorie Shake\"   Isai was also wondering about speaking with a , provided phone number.     Current Nutrition Needs:   Dosing Weight: 57 kg  6911-4625+ kcal (30-35+ kcal/kg) Increased needs, repletion  70-85+ gm protein (1.2-1.5+ gm/kg) Increased needs, repletion    Goal Nutrition Supplement Intake: Ensure Plus or Boost Plus (vanilla or strawberry flavor) 3 bottles per day. Provides ~1050 kcal and 48 gm protein. Meets 61% kcal needs and 69% protein needs.      Follow-up Required: As needed. Provided oncology RD phone number.     Mari Panchal RD, LD  [phone]      "

## 2025-07-25 ENCOUNTER — PRE VISIT (OUTPATIENT)
Dept: ONCOLOGY | Facility: CLINIC | Age: 70
End: 2025-07-25
Payer: MEDICARE

## 2025-07-28 DIAGNOSIS — C18.9 METASTATIC COLON CANCER TO LIVER (H): ICD-10-CM

## 2025-07-28 DIAGNOSIS — C78.7 METASTATIC COLON CANCER TO LIVER (H): ICD-10-CM

## 2025-07-28 DIAGNOSIS — R10.11 RUQ ABDOMINAL PAIN: ICD-10-CM

## 2025-07-28 LAB
LAB TESTS NARRATIVE: NORMAL
LABORATORY COMMENT REPORT: NORMAL
PATH INTERP SPEC-IMP: NORMAL
SEQUENCING METHOD PNL BLD/T: NORMAL
SEQUENCING METHOD PNL BLD/T: NORMAL
SPECIMEN TYPE: NORMAL

## 2025-07-28 PROCEDURE — G0452 MOLECULAR PATHOLOGY INTERPR: HCPCS | Mod: 26 | Performed by: STUDENT IN AN ORGANIZED HEALTH CARE EDUCATION/TRAINING PROGRAM

## 2025-07-28 RX ORDER — ACETAMINOPHEN 500 MG
500 TABLET ORAL EVERY 6 HOURS
Qty: 120 TABLET | Refills: 5 | Status: SHIPPED | OUTPATIENT
Start: 2025-07-28

## 2025-07-28 RX ORDER — HYDROMORPHONE HYDROCHLORIDE 2 MG/1
2 TABLET ORAL
Qty: 75 TABLET | Refills: 0 | Status: SHIPPED | OUTPATIENT
Start: 2025-07-28

## 2025-07-28 RX ORDER — GABAPENTIN 100 MG/1
100 CAPSULE ORAL 3 TIMES DAILY
Qty: 90 CAPSULE | Refills: 3 | Status: SHIPPED | OUTPATIENT
Start: 2025-08-08

## 2025-07-28 RX ORDER — HYDROXYZINE HYDROCHLORIDE 10 MG/1
10 TABLET, FILM COATED ORAL 3 TIMES DAILY PRN
Qty: 90 TABLET | Refills: 3 | Status: SHIPPED | OUTPATIENT
Start: 2025-07-28

## 2025-07-28 NOTE — TELEPHONE ENCOUNTER
Received telephone call from patient requesting refill of hydroxyzine, dilaudid, tylenol and gabapentin.     Last refill of dilaudid: per chart review, 7/15/25. Not reported on   Last refill of gabapentin: 7/11/25  Last office visit: 7/15/25  Scheduled for follow up 7/31/25     Will route request to NP for review.     Reviewed [place]  Report.

## 2025-07-29 ENCOUNTER — PATIENT OUTREACH (OUTPATIENT)
Dept: CARE COORDINATION | Facility: CLINIC | Age: 70
End: 2025-07-29

## 2025-07-29 PROCEDURE — 81455 SO/HL 51/>GSAP DNA/DNA&RNA: CPT | Performed by: STUDENT IN AN ORGANIZED HEALTH CARE EDUCATION/TRAINING PROGRAM

## 2025-07-29 NOTE — PROGRESS NOTES
Social Work - Intervention  [facility]    Data/Intervention:  Patient Name: Isai Leong Goes By: Isai       /Age: 1955 (69 year old)     Visit Type: telephone  Referral Source: Dr. Jarrell  Reason for Referral:   Preferred Location: [facility]   Reason for Referral: Community Resources   Specify Community Resources: Pt looking for assistance to find a POA for estate and finances     Psychosocial Information/Concerns:  Isai reports that he would like:   1) a power chair and cane to help with his mobility   2) a  to visit him in his home.     Isai receptive to this clinician outreaching to oncologist to explore seating evaluation for consideration of power chair. SW also called and LVM with [facility]  Edward, requesting SW be added to home care team for assistance in coordinating resources/completing applications in-person.    SW provided education about health care proxies vs. Power of  for finances. Isai expressed a willingness to have resources sent to via email: [email or web address].    SW sent the following:   Cancer Legal Care/Triage Cancer  [employer] [facility]  [place]  contact info  Honoring [employer]- Health Care Proxy     Assessment/Plan:  Onc SW will continue to be available as needed for ongoing psychosocial support. Isai knows to outreach to Zena Goodrich for ongoing assistance if needed.      Provided patient/family with contact information and availability.    LEXI Snell, [facility]  Clinical - Adult Oncology  Phone: [phone]  She/Her/Hers  [facility]: Jasmyne MCNULTY  8am-4:30pm  [facility]: JUWAN Aguilar F 8am-4:30pm   Support Groups at [facility]: Social Work Services for Cancer Patients ([email or web address])

## 2025-07-30 DIAGNOSIS — C18.9 METASTATIC COLON CANCER TO LIVER (H): Primary | ICD-10-CM

## 2025-07-30 DIAGNOSIS — C78.7 METASTATIC COLON CANCER TO LIVER (H): Primary | ICD-10-CM

## 2025-07-30 DIAGNOSIS — R64 CANCER CACHEXIA: ICD-10-CM

## 2025-07-30 DIAGNOSIS — G89.3 CANCER RELATED PAIN: ICD-10-CM

## 2025-07-31 ENCOUNTER — VIRTUAL VISIT (OUTPATIENT)
Dept: PALLIATIVE MEDICINE | Facility: CLINIC | Age: 70
End: 2025-07-31
Payer: MEDICARE

## 2025-07-31 ENCOUNTER — TELEPHONE (OUTPATIENT)
Dept: PALLIATIVE MEDICINE | Facility: CLINIC | Age: 70
End: 2025-07-31

## 2025-07-31 DIAGNOSIS — C78.7 METASTATIC COLON CANCER TO LIVER (H): Primary | ICD-10-CM

## 2025-07-31 DIAGNOSIS — C18.9 METASTATIC COLON CANCER TO LIVER (H): Primary | ICD-10-CM

## 2025-07-31 NOTE — PROGRESS NOTES
Palliative Care:    Patient did not answer calls to initiate visit.   Will reschedule.     GIO Monreal CNP

## 2025-07-31 NOTE — PATIENT INSTRUCTIONS
Thank you for meeting with us in the [facility] Palliative [facility].    How to get a hold of us:  For non-urgent matters, MyChart works best.    For more urgent matters, or if you prefer not to use MyChart, call our clinic nurse coordinator Nayely Urbina RN at [phone] or [phone]    We have an on-call number for evenings and weekends. Please call this only if you are having uncontrolled symptoms or serious side effects from your medicines: [phone].     For refills, please give us a week (5 working days) notice. We don't always have providers available everyday to do refills. If you call the day you run out of your medicine, we may not be able to refill it in time, so call 5 days in advance!

## 2025-08-12 LAB
LAB TEST RESULTS REPORTED IN RPTPERIOD: NORMAL
LOCATION OF TASK: NORMAL
SPECIMEN TYPE: NORMAL

## 2025-08-18 ENCOUNTER — PATIENT OUTREACH (OUTPATIENT)
Dept: CARE COORDINATION | Facility: CLINIC | Age: 70
End: 2025-08-18
Payer: MEDICARE

## 2025-08-25 DIAGNOSIS — R10.11 RUQ ABDOMINAL PAIN: ICD-10-CM

## 2025-08-26 DIAGNOSIS — C18.9 METASTATIC COLON CANCER TO LIVER (H): Primary | ICD-10-CM

## 2025-08-26 DIAGNOSIS — R64 CANCER CACHEXIA: ICD-10-CM

## 2025-08-26 DIAGNOSIS — C78.7 METASTATIC COLON CANCER TO LIVER (H): Primary | ICD-10-CM

## 2025-08-26 DIAGNOSIS — G89.3 CANCER RELATED PAIN: ICD-10-CM

## 2025-08-26 RX ORDER — HYDROMORPHONE HYDROCHLORIDE 2 MG/1
2 TABLET ORAL
Qty: 75 TABLET | Refills: 0 | Status: SHIPPED | OUTPATIENT
Start: 2025-08-26

## 2025-08-27 ENCOUNTER — THERAPY VISIT (OUTPATIENT)
Dept: OCCUPATIONAL THERAPY | Facility: CLINIC | Age: 70
End: 2025-08-27
Attending: INTERNAL MEDICINE
Payer: MEDICARE

## 2025-08-27 DIAGNOSIS — C78.7 METASTATIC COLON CANCER TO LIVER (H): ICD-10-CM

## 2025-08-27 DIAGNOSIS — G89.3 CANCER RELATED PAIN: ICD-10-CM

## 2025-08-27 DIAGNOSIS — C18.9 METASTATIC COLON CANCER TO LIVER (H): ICD-10-CM

## 2025-08-27 DIAGNOSIS — R64 CANCER CACHEXIA: ICD-10-CM

## 2025-08-27 DIAGNOSIS — Z74.09 IMPAIRED MOBILITY AND ADLS: Primary | ICD-10-CM

## 2025-08-27 DIAGNOSIS — Z78.9 IMPAIRED MOBILITY AND ADLS: Primary | ICD-10-CM

## 2025-08-27 PROCEDURE — 97542 WHEELCHAIR MNGMENT TRAINING: CPT | Mod: GO | Performed by: OCCUPATIONAL THERAPIST

## 2025-09-03 ENCOUNTER — TELEPHONE (OUTPATIENT)
Dept: PALLIATIVE CARE | Facility: CLINIC | Age: 70
End: 2025-09-03

## 2025-09-03 ENCOUNTER — VIRTUAL VISIT (OUTPATIENT)
Dept: PALLIATIVE MEDICINE | Facility: CLINIC | Age: 70
End: 2025-09-03
Attending: NURSE PRACTITIONER
Payer: MEDICARE

## 2025-09-03 DIAGNOSIS — R64 CANCER CACHEXIA: ICD-10-CM

## 2025-09-03 DIAGNOSIS — G89.3 CANCER RELATED PAIN: ICD-10-CM

## 2025-09-03 DIAGNOSIS — G89.3 CANCER ASSOCIATED PAIN: ICD-10-CM

## 2025-09-03 DIAGNOSIS — C18.9 METASTATIC COLON CANCER TO LIVER (H): ICD-10-CM

## 2025-09-03 DIAGNOSIS — R10.11 RUQ ABDOMINAL PAIN: ICD-10-CM

## 2025-09-03 DIAGNOSIS — C78.7 METASTATIC COLON CANCER TO LIVER (H): ICD-10-CM

## 2025-09-03 PROCEDURE — 1125F AMNT PAIN NOTED PAIN PRSNT: CPT | Mod: 95 | Performed by: NURSE PRACTITIONER

## 2025-09-03 PROCEDURE — 98007 SYNCH AUDIO-VIDEO EST HI 40: CPT | Performed by: NURSE PRACTITIONER

## 2025-09-03 RX ORDER — METHADONE HYDROCHLORIDE 5 MG/1
5 TABLET ORAL EVERY 12 HOURS
Qty: 60 TABLET | Refills: 0 | Status: SHIPPED | OUTPATIENT
Start: 2025-09-03

## 2025-09-03 RX ORDER — HYDROMORPHONE HYDROCHLORIDE 2 MG/1
2 TABLET ORAL
Qty: 75 TABLET | Refills: 0 | Status: SHIPPED | OUTPATIENT
Start: 2025-09-03

## (undated) RX ORDER — FENTANYL CITRATE 50 UG/ML
INJECTION, SOLUTION INTRAMUSCULAR; INTRAVENOUS
Status: DISPENSED
Start: 2025-06-30